# Patient Record
Sex: FEMALE | Race: WHITE | Employment: OTHER | URBAN - NONMETROPOLITAN AREA
[De-identification: names, ages, dates, MRNs, and addresses within clinical notes are randomized per-mention and may not be internally consistent; named-entity substitution may affect disease eponyms.]

---

## 2020-10-06 ENCOUNTER — HOSPITAL ENCOUNTER (EMERGENCY)
Age: 85
Discharge: HOME | End: 2020-10-06
Payer: MEDICARE

## 2020-10-06 VITALS
TEMPERATURE: 97.5 F | HEART RATE: 70 BPM | RESPIRATION RATE: 16 BRPM | WEIGHT: 155 LBS | SYSTOLIC BLOOD PRESSURE: 139 MMHG | OXYGEN SATURATION: 97 % | DIASTOLIC BLOOD PRESSURE: 65 MMHG

## 2020-10-06 PROCEDURE — 99203 OFFICE O/P NEW LOW 30 MIN: CPT

## 2020-10-06 PROCEDURE — 99202 OFFICE O/P NEW SF 15 MIN: CPT | Performed by: NURSE PRACTITIONER

## 2020-10-06 RX ORDER — METOPROLOL SUCCINATE 50 MG/1
25 TABLET, EXTENDED RELEASE ORAL DAILY
Status: ON HOLD | COMMUNITY
End: 2021-08-09

## 2020-10-06 ASSESSMENT — ENCOUNTER SYMPTOMS
SHORTNESS OF BREATH: 0
RHINORRHEA: 0
COUGH: 0
VOMITING: 0
DIARRHEA: 0
TROUBLE SWALLOWING: 0
SORE THROAT: 0
NAUSEA: 0
EYE DISCHARGE: 0
EYE REDNESS: 0

## 2020-10-06 NOTE — ED PROVIDER NOTES
[address]       Chief Complaint   Patient presents with    Leg Swelling     left leg x \"several months\" gradually getting worse       Nurses Notes reviewed and I agree except as noted in the HPI. HISTORY OF PRESENT ILLNESS   Jovan Almendarez is a 80 y.o. female who presents with daughter for complaints of left leg swelling. Onset \"several months\" ago. Symptoms worsening per patient. Per daughter patient fell in January and was evaluated at [facility] ER. She states patient was doing well for a while until she had noted her complaining of leg swelling. Denies leg pain. No fever. No chest pain. No shortness of breath. No recent travel or procedures. Daughter patient does not elevate her legs nor drink water. \"The only thing she drinks his coffee. \"    Patient scared, concerned if she lies down the fluid will go into her lungs. No treatment prior to arrival.    REVIEW OF SYSTEMS     Review of Systems   Constitutional: Negative for chills, diaphoresis, fatigue and fever. HENT: Negative for congestion, ear pain, rhinorrhea, sore throat and trouble swallowing. Eyes: Negative for discharge and redness. Respiratory: Negative for cough and shortness of breath. Cardiovascular: Positive for leg swelling. Negative for chest pain and palpitations. Gastrointestinal: Negative for diarrhea, nausea and vomiting. Genitourinary: Negative for decreased urine volume. Musculoskeletal: Negative for arthralgias, neck pain and neck stiffness. Skin: Negative for rash. Neurological: Negative for numbness and headaches. Hematological: Negative for adenopathy. Psychiatric/Behavioral: Negative for sleep disturbance. PAST MEDICAL HISTORY   History reviewed. No pertinent past medical history. SURGICAL HISTORY     Patient  has no past surgical history on file.     CURRENT MEDICATIONS       Previous Medications    METOPROLOL SUCCINATE (TOPROL XL) 25 MG EXTENDED RELEASE TABLET    Take 25 mg by mouth daily       ALLERGIES     Patient is is allergic to pcn [penicillins]. FAMILY HISTORY     Patient'sfamily history is not on file. SOCIAL HISTORY     Patient  reports that she has quit smoking. She has never used smokeless tobacco. She reports previous alcohol use. She reports that she does not use drugs. PHYSICAL EXAM     ED TRIAGE VITALS  BP: 139/65, Temp: 97.5 °F (36.4 °C), Pulse: 70, Resp: 16, SpO2: 97 %  Physical Exam  Vitals signs and nursing note reviewed. Constitutional:       General: She is not in acute distress. Appearance: Normal appearance. HENT:      Head: Normocephalic and atraumatic. Right Ear: External ear normal.      Left Ear: External ear normal.      Nose: No congestion. Eyes:      General: No scleral icterus. Conjunctiva/sclera: Conjunctivae normal.   Neck:      Musculoskeletal: Normal range of motion. Cardiovascular:      Rate and Rhythm: Normal rate and regular rhythm. Pulses: Normal pulses. Posterior tibial pulses are 2+ on the right side and 2+ on the left side. Heart sounds: Normal heart sounds, S1 normal and S2 normal.   Pulmonary:      Effort: Pulmonary effort is normal. No respiratory distress. Breath sounds: Normal breath sounds and air entry. Musculoskeletal:      Right lower leg: She exhibits swelling. No edema. Left lower leg: She exhibits swelling. Edema present. Comments: Bilateral calf 15\"   Skin:     General: Skin is warm and dry. Capillary Refill: Capillary refill takes less than 2 seconds. Coloration: Skin is not jaundiced. Findings: No bruising, ecchymosis, erythema or rash. Neurological:      Mental Status: She is alert and oriented to person, place, and time. Sensory: Sensation is intact. Psychiatric:         Mood and Affect: Mood normal.         Behavior: Behavior normal. Behavior is cooperative. DIAGNOSTIC RESULTS   Labs: No results found for this visit on 10/06/20. IMAGING:  No orders to display     URGENT CARE COURSE:     Vitals:    10/06/20 1353   BP: 139/65   Pulse: 70   Resp: 16   Temp: 97.5 °F (36.4 °C)   SpO2: 97%   Weight: 155 lb (70.3 kg)       Medications - No data to display  PROCEDURES:  None  FINALIMPRESSION      1. Leg swelling        DISPOSITION/PLAN   DISPOSITION Decision To Discharge 10/06/2020 02:09:09 PM  Nontoxic, no acute distress. Bilateral lower leg swelling, minimal.  No leg pain. Heart and lung sounds normal.  No distress. Discussed elevation of legs, no added salt, and increase water. If worsening go to ER. PATIENT REFERRED TO:  SHAMA Almonte - CNP  [address]  [place] [address] [phone]      Follow up with PCP as needed. Elevate legs. Increase water intake.   If worse go to ER>    DISCHARGE MEDICATIONS:  New Prescriptions    No medications on file     Current Discharge Medication List          [address] Venita Dong, SHAMA - CNP  10/06/20 0088

## 2020-10-06 NOTE — ED NOTES
Pt discharged. Pt and pt's daughter verbalized understanding of discharge instructions. Pt walked out with a cane. Pt was in stable condition.      Gerson Cash, NIKHIL  41/45/83 0938

## 2020-10-06 NOTE — ED NOTES
Lower legs measured right calf 15\" left calf 15\" right ankle 6\" left ankle 6\" - no swelling  Or redness noted on assessment.   Legs cool to touch pink and dry(not excessively for age)     Kenzie Jacob LPN  74/80/58 3172

## 2020-11-10 ENCOUNTER — HOSPITAL ENCOUNTER (EMERGENCY)
Age: 85
Discharge: HOME | End: 2020-11-10
Attending: EMERGENCY MEDICINE
Payer: MEDICARE

## 2020-11-10 ENCOUNTER — APPOINTMENT (OUTPATIENT)
Dept: GENERAL RADIOLOGY | Age: 85
End: 2020-11-10
Payer: MEDICARE

## 2020-11-10 ENCOUNTER — APPOINTMENT (OUTPATIENT)
Dept: CT IMAGING | Age: 85
End: 2020-11-10
Payer: MEDICARE

## 2020-11-10 VITALS
RESPIRATION RATE: 14 BRPM | HEART RATE: 66 BPM | TEMPERATURE: 98.5 F | HEIGHT: 63 IN | BODY MASS INDEX: 28.35 KG/M2 | DIASTOLIC BLOOD PRESSURE: 73 MMHG | WEIGHT: 160 LBS | SYSTOLIC BLOOD PRESSURE: 208 MMHG | OXYGEN SATURATION: 97 %

## 2020-11-10 LAB
ALBUMIN SERPL-MCNC: 3.9 G/DL (ref 3.5–5.1)
ALP BLD-CCNC: 86 U/L (ref 38–126)
ALT SERPL-CCNC: 7 U/L (ref 11–66)
ANION GAP SERPL CALCULATED.3IONS-SCNC: 11 MEQ/L (ref 8–16)
AST SERPL-CCNC: 15 U/L (ref 5–40)
BACTERIA: ABNORMAL /HPF
BASOPHILS # BLD: 1.2 %
BASOPHILS ABSOLUTE: 0.1 THOU/MM3 (ref 0–0.1)
BILIRUB SERPL-MCNC: 0.3 MG/DL (ref 0.3–1.2)
BILIRUBIN URINE: NEGATIVE
BLOOD, URINE: NEGATIVE
BUN BLDV-MCNC: 17 MG/DL (ref 7–22)
CALCIUM SERPL-MCNC: 9.3 MG/DL (ref 8.5–10.5)
CASTS 2: ABNORMAL /LPF
CASTS UA: ABNORMAL /LPF
CHARACTER, URINE: CLEAR
CHLORIDE BLD-SCNC: 99 MEQ/L (ref 98–111)
CO2: 23 MEQ/L (ref 23–33)
COLOR: YELLOW
CREAT SERPL-MCNC: 0.7 MG/DL (ref 0.4–1.2)
CRYSTALS, UA: ABNORMAL
EKG ATRIAL RATE: 68 BPM
EKG ATRIAL RATE: 71 BPM
EKG P AXIS: 91 DEGREES
EKG P-R INTERVAL: 154 MS
EKG P-R INTERVAL: 156 MS
EKG Q-T INTERVAL: 392 MS
EKG Q-T INTERVAL: 400 MS
EKG QRS DURATION: 72 MS
EKG QRS DURATION: 74 MS
EKG QTC CALCULATION (BAZETT): 423 MS
EKG QTC CALCULATION (BAZETT): 425 MS
EKG R AXIS: 19 DEGREES
EKG R AXIS: 4 DEGREES
EKG T AXIS: 32 DEGREES
EKG T AXIS: 35 DEGREES
EKG VENTRICULAR RATE: 68 BPM
EKG VENTRICULAR RATE: 70 BPM
EOSINOPHIL # BLD: 2.7 %
EOSINOPHILS ABSOLUTE: 0.3 THOU/MM3 (ref 0–0.4)
EPITHELIAL CELLS, UA: ABNORMAL /HPF
ERYTHROCYTE [DISTWIDTH] IN BLOOD BY AUTOMATED COUNT: 13.7 % (ref 11.5–14.5)
ERYTHROCYTE [DISTWIDTH] IN BLOOD BY AUTOMATED COUNT: 46.7 FL (ref 35–45)
GFR SERPL CREATININE-BSD FRML MDRD: 78 ML/MIN/1.73M2
GLUCOSE BLD-MCNC: 101 MG/DL (ref 70–108)
GLUCOSE URINE: NEGATIVE MG/DL
HCT VFR BLD CALC: 40 % (ref 37–47)
HEMOGLOBIN: 12.6 GM/DL (ref 12–16)
IMMATURE GRANS (ABS): 0.04 THOU/MM3 (ref 0–0.07)
IMMATURE GRANULOCYTES: 0.4 %
KETONES, URINE: NEGATIVE
LEUKOCYTE ESTERASE, URINE: ABNORMAL
LYMPHOCYTES # BLD: 30.1 %
LYMPHOCYTES ABSOLUTE: 2.9 THOU/MM3 (ref 1–4.8)
MCH RBC QN AUTO: 29.1 PG (ref 26–33)
MCHC RBC AUTO-ENTMCNC: 31.5 GM/DL (ref 32.2–35.5)
MCV RBC AUTO: 92.4 FL (ref 81–99)
MISCELLANEOUS 2: ABNORMAL
MONOCYTES # BLD: 8.4 %
MONOCYTES ABSOLUTE: 0.8 THOU/MM3 (ref 0.4–1.3)
NITRITE, URINE: NEGATIVE
NUCLEATED RED BLOOD CELLS: 0 /100 WBC
OSMOLALITY CALCULATION: 268.1 MOSMOL/KG (ref 275–300)
PH UA: 6 (ref 5–9)
PLATELET # BLD: 279 THOU/MM3 (ref 130–400)
PMV BLD AUTO: 10.5 FL (ref 9.4–12.4)
POTASSIUM REFLEX MAGNESIUM: 4.4 MEQ/L (ref 3.5–5.2)
PRO-BNP: 268.9 PG/ML (ref 0–1800)
PROTEIN UA: NEGATIVE
RBC # BLD: 4.33 MILL/MM3 (ref 4.2–5.4)
RBC URINE: ABNORMAL /HPF
REASON FOR REJECTION: NORMAL
REASON FOR REJECTION: NORMAL
REJECTED TEST: NORMAL
REJECTED TEST: NORMAL
RENAL EPITHELIAL, UA: ABNORMAL
SEG NEUTROPHILS: 57.2 %
SEGMENTED NEUTROPHILS ABSOLUTE COUNT: 5.6 THOU/MM3 (ref 1.8–7.7)
SODIUM BLD-SCNC: 133 MEQ/L (ref 135–145)
SPECIFIC GRAVITY, URINE: 1.01 (ref 1–1.03)
TOTAL PROTEIN: 6.9 G/DL (ref 6.1–8)
TROPONIN T: < 0.01 NG/ML
TSH SERPL DL<=0.05 MIU/L-ACNC: 1.68 UIU/ML (ref 0.4–4.2)
UROBILINOGEN, URINE: 0.2 EU/DL (ref 0–1)
WBC # BLD: 9.8 THOU/MM3 (ref 4.8–10.8)
WBC UA: ABNORMAL /HPF
YEAST: ABNORMAL

## 2020-11-10 PROCEDURE — 83880 ASSAY OF NATRIURETIC PEPTIDE: CPT

## 2020-11-10 PROCEDURE — 6370000000 HC RX 637 (ALT 250 FOR IP): Performed by: EMERGENCY MEDICINE

## 2020-11-10 PROCEDURE — 93010 ELECTROCARDIOGRAM REPORT: CPT | Performed by: NUCLEAR MEDICINE

## 2020-11-10 PROCEDURE — 71045 X-RAY EXAM CHEST 1 VIEW: CPT

## 2020-11-10 PROCEDURE — 99215 OFFICE O/P EST HI 40 MIN: CPT

## 2020-11-10 PROCEDURE — 80053 COMPREHEN METABOLIC PANEL: CPT

## 2020-11-10 PROCEDURE — 93005 ELECTROCARDIOGRAM TRACING: CPT | Performed by: EMERGENCY MEDICINE

## 2020-11-10 PROCEDURE — 93005 ELECTROCARDIOGRAM TRACING: CPT | Performed by: PHYSICIAN ASSISTANT

## 2020-11-10 PROCEDURE — 70450 CT HEAD/BRAIN W/O DYE: CPT

## 2020-11-10 PROCEDURE — 36415 COLL VENOUS BLD VENIPUNCTURE: CPT

## 2020-11-10 PROCEDURE — 84443 ASSAY THYROID STIM HORMONE: CPT

## 2020-11-10 PROCEDURE — 87086 URINE CULTURE/COLONY COUNT: CPT

## 2020-11-10 PROCEDURE — 81001 URINALYSIS AUTO W/SCOPE: CPT

## 2020-11-10 PROCEDURE — 85025 COMPLETE CBC W/AUTO DIFF WBC: CPT

## 2020-11-10 PROCEDURE — 99283 EMERGENCY DEPT VISIT LOW MDM: CPT

## 2020-11-10 PROCEDURE — 84484 ASSAY OF TROPONIN QUANT: CPT

## 2020-11-10 RX ORDER — CEFDINIR 300 MG/1
300 CAPSULE ORAL ONCE
Status: COMPLETED | OUTPATIENT
Start: 2020-11-10 | End: 2020-11-10

## 2020-11-10 RX ORDER — CEFDINIR 300 MG/1
300 CAPSULE ORAL 2 TIMES DAILY
Qty: 14 CAPSULE | Refills: 0 | Status: SHIPPED | OUTPATIENT
Start: 2020-11-10 | End: 2020-11-17

## 2020-11-10 RX ADMIN — CEFDINIR 300 MG: 300 CAPSULE ORAL at 21:06

## 2020-11-10 ASSESSMENT — ENCOUNTER SYMPTOMS
ABDOMINAL PAIN: 0
STRIDOR: 0
TROUBLE SWALLOWING: 0
WHEEZING: 0
FACIAL SWELLING: 0
BACK PAIN: 0
BLOOD IN STOOL: 0
SORE THROAT: 0
EYE DISCHARGE: 0
SINUS PRESSURE: 0
EYE REDNESS: 0
EYE PAIN: 0
VOMITING: 0
CHOKING: 0
COUGH: 1
VOICE CHANGE: 0
CONSTIPATION: 0
NAUSEA: 0
SHORTNESS OF BREATH: 0
DIARRHEA: 0

## 2020-11-10 NOTE — ED PROVIDER NOTES
Patient was seen and evaluated for rapid triage in intake. She is with daughter who provides history. Patient has had generalized weakness for the past 3 days, sudden in onset without lateralization. She's been sleeping more than usual but without any mental status changes, fever, headache, chest pain, SOB, cough, vomiting, diarrhea, urinary changes, abnormal bleeding or anticoagulant use. She reports feeling edematous since a fall months ago, saw PCP and reassured. Denies any change in the edema. No recent falls. Was seen at [facility] and sent to the ED for evaluation. On exam, she is alert, GCS 15. Oriented to person, place and month, uncertain of year and doesn't watch politics to know president. Conjunctiva without pallor, PERRLA, sensation symmetric with no focal weakness, 5/5 bilaterally with no facial droop or speech changes. Heart is RRR, lung sounds CTA. Abdomen is soft, nontender. No skin tenting, capillary refill <2 seconds. Will initiate labs, CXR, CT head and EKG.      Winnie Phoenix PA-C  11/10/20 1556 Rafa Vale(Attending)

## 2020-11-10 NOTE — ED NOTES
Pt transported to room 17 in stable condition. Vitals stable. Denies any pain. Call light in reach.  Will continue to monitor     Yaneli Infante RN  11/10/20 3397

## 2020-11-10 NOTE — ED TRIAGE NOTES
Pt complains of bilat leg weakness and fatigue with a slight cough for approx 2 days. States she so tired she has been using her walker all the time because shes scared she will fall. Pt denies chest pain or shortness of breath. Daughter here with pt states she doesn't think she is drinking enough fluids.

## 2020-11-10 NOTE — ED PROVIDER NOTES
[address] [facility] [address] [address] EMERGENCY DEPT  UrgentCare Encounter      [address] [facility]       Chief Complaint   Patient presents with    Fatigue       Nurses Notes reviewed and I agree except as noted in the HPI. HISTORY OF PRESENT ILLNESS   Weston Malik is a 80 y.o. female who presents with 2-day history of fatigue and weakness with difficulty ambulating and near syncope. She has congestion and dry cough. No chest pain, shortness of breath, fever, vomiting, syncope, GI bleed, hemoptysis, altered mental status, motor or sensory deficits, problems with speech. No previous history of DM, CHF, PE, CVA. REVIEW OF SYSTEMS     Review of Systems   Constitutional: Positive for fatigue. Negative for appetite change, chills, fever and unexpected weight change. HENT: Positive for congestion. Negative for ear discharge, ear pain, facial swelling, hearing loss, nosebleeds, postnasal drip, sinus pressure, sore throat, trouble swallowing and voice change. Eyes: Negative for pain, discharge, redness and visual disturbance. Respiratory: Positive for cough. Negative for choking, shortness of breath, wheezing and stridor. Cardiovascular: Negative for chest pain and leg swelling. Gastrointestinal: Negative for abdominal pain, blood in stool, constipation, diarrhea, nausea and vomiting. Genitourinary: Negative for dysuria, flank pain, frequency, hematuria, urgency, vaginal bleeding and vaginal discharge. Musculoskeletal: Negative for arthralgias, back pain, neck pain and neck stiffness. Skin: Negative for rash. Neurological: Positive for dizziness and weakness. Negative for seizures, syncope, light-headedness and headaches. Hematological: Negative for adenopathy. Does not bruise/bleed easily. Psychiatric/Behavioral: Negative for confusion, sleep disturbance and suicidal ideas. The patient is not nervous/anxious. All other systems reviewed and are negative. PAST MEDICAL HISTORY   No past medical history on file. meningismus  Cardiovascular:      Rate and Rhythm: Normal rate and regular rhythm. Pulses: Normal pulses. Heart sounds: Normal heart sounds, S1 normal and S2 normal. No murmur. No friction rub. No gallop. Pulmonary:      Effort: Pulmonary effort is normal. No tachypnea or respiratory distress. Breath sounds: Normal breath sounds. No stridor. No decreased breath sounds, wheezing, rhonchi or rales. Comments: Dry cough, lungs clear  Chest:      Chest wall: No tenderness. Abdominal:      General: Bowel sounds are normal. There is no distension. Palpations: Abdomen is soft. There is no mass. Tenderness: There is no abdominal tenderness. There is no right CVA tenderness, left CVA tenderness, guarding or rebound. Comments: Soft nontender   Musculoskeletal: Normal range of motion. General: No tenderness. Right lower leg: Normal.      Left lower leg: Normal.   Lymphadenopathy:      Cervical: Cervical adenopathy present. Right cervical: Superficial cervical adenopathy present. No deep cervical adenopathy. Left cervical: Superficial cervical adenopathy present. No deep cervical adenopathy. Skin:     General: Skin is warm and dry. Findings: No erythema or rash. Comments: No rash or bruising   Neurological:      Mental Status: She is alert and oriented to person, place, and time. Cranial Nerves: No cranial nerve deficit. Motor: No abnormal muscle tone. Coordination: Coordination normal.      Deep Tendon Reflexes: Reflexes are normal and symmetric. Reflexes normal.      Comments: Normal speech, no focal findings   Psychiatric:         Attention and Perception: She does not perceive visual hallucinations. Behavior: Behavior normal.         Thought Content:  Thought content normal.         Judgment: Judgment normal.      Comments: No psychosis or depression         DIAGNOSTIC RESULTS   Labs: No results found for this visit on 11/10/20. EKG-normal sinus rhythm no evidence of myocardial ischemia or infarction per my interpretation  IMAGING:  No orders to display     URGENT CARE COURSE:     Vitals:    11/10/20 1304   BP: 130/61   Pulse: 66   Resp: 16   Temp: 97.5 °F (36.4 °C)   SpO2: 97%       Medications - No data to display  PROCEDURES:  None  FINALIMPRESSION      1. General weakness    2. Fatigue, unspecified type        DISPOSITION/PLAN   DISPOSITION Decision To Transfer 11/10/2020 01:31:13 PM  Patient transferred to [facility] ED. She is stable for private vehicle transfer with daughter to drive. Patient accepted in transfer by Meenakshi Singh [facility] ED charge nurse at [phone].    PATIENT REFERRED TO:  to [facility] ED      to [facility] ED    DISCHARGE MEDICATIONS:  New Prescriptions    No medications on file     Current Discharge Medication List          MD Kathryn Garcia MD  11/10/20 3487 [address] Patricia Bernal MD  11/10/20 3487  [address] Patricia Bernal MD  11/10/20 1338

## 2020-11-11 LAB
ORGANISM: ABNORMAL
URINE CULTURE REFLEX: ABNORMAL

## 2020-11-11 ASSESSMENT — ENCOUNTER SYMPTOMS
EYE REDNESS: 0
SHORTNESS OF BREATH: 0
DIARRHEA: 0
COUGH: 0
CHEST TIGHTNESS: 0
BACK PAIN: 0
SORE THROAT: 0
ABDOMINAL DISTENTION: 0
CONSTIPATION: 0
WHEEZING: 0
STRIDOR: 0
VOMITING: 0
EYE DISCHARGE: 0
EYE PAIN: 0
ABDOMINAL PAIN: 0
EYE ITCHING: 0
PHOTOPHOBIA: 0
NAUSEA: 0
RHINORRHEA: 0

## 2020-11-11 NOTE — ED NOTES
Pt and family refusing repeat BP check prior to discharge.      Sintia Sampson, [address] [facility]  11/10/20 4292

## 2020-11-11 NOTE — ED PROVIDER NOTES
[address] ENCOUNTER      PATIENT NAME: Jose Francisco Rubi  MRN: [id number]  : 1928  JACOB: 11/10/2020  PROVIDER: Thomas Saavedra MD      CHIEF COMPLAINT       Chief Complaint   Patient presents with    Fatigue       Nurses Notes reviewed and I agreeexcept as noted in the HPI. HISTORY OF PRESENT ILLNESS    Jose Francisco Rubi is a 80 y.o. female who presents to Emergency Department with General weakness      Onset: Gradual  Location: At home  Severity: Mild  Timing: Last two days  Modifying factors: Worse on exertion  Quality: General weakness  Radiation: No pain  Duration: Persistent  Context: She's been sleeping more than usual but without any mental status changes, fever, headache, chest pain, SOB, cough, vomiting, diarrhea, urinary changes, abnormal bleeding or anticoagulant use. Prior episodes: occasional  Therapy today: None  Associated Symptoms: None  Additional history: None    This HPI was provided by the patient's daughter. REVIEW OF SYSTEMS     Review of Systems   Constitutional: Positive for activity change, appetite change and fatigue. Negative for chills, fever and unexpected weight change. HENT: Negative for congestion, ear discharge, ear pain, hearing loss, nosebleeds, rhinorrhea and sore throat. Eyes: Negative for photophobia, pain, discharge, redness and itching. Respiratory: Negative for cough, chest tightness, shortness of breath, wheezing and stridor. Cardiovascular: Negative for chest pain, palpitations and leg swelling. Gastrointestinal: Negative for abdominal distention, abdominal pain, constipation, diarrhea, nausea and vomiting. Endocrine: Negative for cold intolerance, heat intolerance, polydipsia and polyphagia. Genitourinary: Negative for dysuria, flank pain, frequency and hematuria. Musculoskeletal: Negative for arthralgias, back pain, gait problem, myalgias, neck pain and neck stiffness.    Skin: Negative for pallor, rash and wound. Allergic/Immunologic: Negative for environmental allergies and food allergies. Neurological: Positive for weakness. Negative for dizziness, tremors, syncope and headaches. Psychiatric/Behavioral: Negative for agitation, behavioral problems, confusion, self-injury, sleep disturbance and suicidal ideas. PAST MEDICAL HISTORY   No past medical history on file. SURGICAL HISTORY     No past surgical history on file. CURRENT MEDICATIONS       Previous Medications    METOPROLOL SUCCINATE (TOPROL XL) 25 MG EXTENDED RELEASE TABLET    Take 25 mg by mouth daily       ALLERGIES     Pcn [penicillins]    FAMILY HISTORY     has no family status information on file. family history is not on file. SOCIAL HISTORY      reports that she has quit smoking. She has never used smokeless tobacco. She reports previous alcohol use. She reports that she does not use drugs. PHYSICAL EXAM     INITIAL VITALS:  height is 5' 3\" (1.6 m) and weight is 160 lb (72.6 kg). Her oral temperature is 98.5 °F (36.9 °C). Her blood pressure is 208/73 (abnormal) and her pulse is 66. Her respiration is 14 and oxygen saturation is 97%. Physical Exam  Pulmonary:      Breath sounds: Rales present. Comments: From both lung bases        DIFFERENTIAL DIAGNOSIS:   Includes but not limited to:  Jean Carlos disease, anemia, anxiety, chemotherapy, chronic fatigue syndrome, chronic pain, deconditioning and sedentary life style, dehydration, electrolyte disorders, depression, diabetes, hypothyroidism, CAD, CHF, infection (such as Flu, EB virus, hepatitis C and TB), medication related, Narcotics use, paraneoplastic syndrome, chronic lung disease, renal disease, UTI, pneumonia, sepsis, sleep disorders, radiculopathy, neuromuscular disorders, cancer, demyelinating disorders, CVA, subdural hematoma, amyotrophic lateral sclerosis, RA, SLE.     DIAGNOSTIC RESULTS   EKG: All EKG's are interpreted by the Emergency Department Physician who either signs or Co-signsthis chart in the absence of a cardiologist.  Interpreted by me  No acute changes  EKG Emergency   Result Value Ref Range    Ventricular Rate 70 BPM    Atrial Rate 71 BPM    P-R Interval 154 ms    QRS Duration 74 ms    Q-T Interval 392 ms    QTc Calculation (Bazett) 423 ms    P Axis 91 degrees    R Axis 4 degrees    T Axis 32 degrees       RADIOLOGY: non-plain film images(s) such as CT, Ultrasound and MRI are read by the radiologist.  XR CHEST 1 VIEW   Final Result   1. Mild cardiomegaly. 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 2. Small patch atelectasis/pneumonia left lateral costophrenic sulcus. No effusion. Final report electronically signed by Dr. Lindsay Kenney on 11/10/2020 4:23 PM      CT HEAD WO CONTRAST   Final Result    No evidence of acute intracranial abnormality. **This report has been created using voice recognition software. It may contain minor errors which are inherent in voice recognition technology. **      Final report electronically signed by Dr. Marilou Messina MD on 11/10/2020 4:26 PM          LABS:   Results for orders placed or performed during the hospital encounter of 11/10/20   CBC auto differential   Result Value Ref Range    WBC 9.8 4.8 - 10.8 thou/mm3    RBC 4.33 4.20 - 5.40 mill/mm3    Hemoglobin 12.6 12.0 - 16.0 gm/dl    Hematocrit 40.0 37.0 - 47.0 %    MCV 92.4 81.0 - 99.0 fL    MCH 29.1 26.0 - 33.0 pg    MCHC 31.5 (L) 32.2 - 35.5 gm/dl    RDW-CV 13.7 11.5 - 14.5 %    RDW-SD 46.7 (H) 35.0 - 45.0 fL    Platelets 934 655 - 969 thou/mm3    MPV 10.5 9.4 - 12.4 fL    Seg Neutrophils 57.2 %    Lymphocytes 30.1 %    Monocytes 8.4 %    Eosinophils 2.7 %    Basophils 1.2 %    Immature Granulocytes 0.4 %    Segs Absolute 5.6 1.8 - 7.7 thou/mm3    Lymphocytes Absolute 2.9 1.0 - 4.8 thou/mm3    Monocytes Absolute 0.8 0.4 - 1.3 thou/mm3    Eosinophils Absolute 0.3 0.0 - 0.4 thou/mm3    Basophils Absolute 0.1 0.0 - 0.1 thou/mm3    Immature Grans (Abs) 0.04 0.00 - 0.07 thou/mm3    nRBC 0 /100 wbc   SPECIMEN REJECTION   Result Value Ref Range    Rejected Test cmp bnp trop     Reason for Rejection see below    SPECIMEN REJECTION   Result Value Ref Range    Rejected Test cmp trop bnp     Reason for Rejection see below    Brain Natriuretic Peptide   Result Value Ref Range    Pro-.9 0.0 - 1800.0 pg/mL   Comprehensive Metabolic Panel w/ Reflex to MG   Result Value Ref Range    Glucose 101 70 - 108 mg/dL    CREATININE 0.7 0.4 - 1.2 mg/dL    BUN 17 7 - 22 mg/dL    Sodium 133 (L) 135 - 145 meq/L    Potassium reflex Magnesium 4.4 3.5 - 5.2 meq/L    Chloride 99 98 - 111 meq/L    CO2 23 23 - 33 meq/L    Calcium 9.3 8.5 - 10.5 mg/dL    AST 15 5 - 40 U/L    Alkaline Phosphatase 86 38 - 126 U/L    Total Protein 6.9 6.1 - 8.0 g/dL    Alb 3.9 3.5 - 5.1 g/dL    Total Bilirubin 0.3 0.3 - 1.2 mg/dL    ALT 7 (L) 11 - 66 U/L   Troponin   Result Value Ref Range    Troponin T < 0.010 ng/ml   TSH with Reflex   Result Value Ref Range    TSH 1.680 0.400 - 4.200 uIU/mL   Urine with Reflexed Micro   Result Value Ref Range    Glucose, Ur NEGATIVE NEGATIVE mg/dl    Bilirubin Urine NEGATIVE NEGATIVE    Ketones, Urine NEGATIVE NEGATIVE    Specific Gravity, Urine 1.015 1.002 - 1.030    Blood, Urine NEGATIVE NEGATIVE    pH, UA 6.0 5.0 - 9.0    Protein, UA NEGATIVE NEGATIVE    Urobilinogen, Urine 0.2 0.0 - 1.0 eu/dl    Nitrite, Urine NEGATIVE NEGATIVE    Leukocyte Esterase, Urine MODERATE (A) NEGATIVE    Color, UA YELLOW STRAW-YELLOW    Character, Urine CLEAR CLEAR-SL CLOUD    RBC, UA 0-2 0-2/hpf /hpf    WBC, UA 10-15 0-4/hpf /hpf    Epithelial Cells, UA 3-5 3-5/hpf /hpf    Bacteria, UA NONE SEEN FEW/NONE SEEN /hpf    Casts UA NONE SEEN NONE SEEN /lpf    Crystals, UA NONE SEEN NONE SEEN    Renal Epithelial, UA NONE SEEN NONE SEEN    Yeast, UA NONE SEEN NONE SEEN    CASTS 2 NONE SEEN NONE SEEN /lpf    MISCELLANEOUS 2 NONE SEEN    Anion Gap Result Value Ref Range    Anion Gap 11.0 8.0 - 16.0 meq/L   Glomerular Filtration Rate, Estimated   Result Value Ref Range    Est, Glom Filt Rate 78 (A) ml/min/1.73m2   Osmolality   Result Value Ref Range    Osmolality Calc 268.1 (L) 275.0 - 300.0 mOsmol/kg   EKG 12 Lead   Result Value Ref Range    Ventricular Rate 68 BPM    Atrial Rate 68 BPM    P-R Interval 156 ms    QRS Duration 72 ms    Q-T Interval 400 ms    QTc Calculation (Bazett) 425 ms    R Axis 19 degrees    T Axis 35 degrees   EKG Emergency   Result Value Ref Range    Ventricular Rate 70 BPM    Atrial Rate 71 BPM    P-R Interval 154 ms    QRS Duration 74 ms    Q-T Interval 392 ms    QTc Calculation (Bazett) 423 ms    P Axis 91 degrees    R Axis 4 degrees    T Axis 32 degrees       EMERGENCY DEPARTMENT COURSE:   Vitals:    Vitals:    11/10/20 1543 11/10/20 1734 11/10/20 1843 11/1934   BP: (!) 179/78 (!) 208/73     Pulse: 69 65 67 66   Resp: 16 16 16 14   Temp: 98.5 °F (36.9 °C)      TempSrc: Oral      SpO2: 97% 98% 98% 97%   Weight: 160 lb (72.6 kg)      Height: 5' 3\" (1.6 m)        8:45 PM: Patient is seen and evaluated in a timely fashion.      ACTIONS:  Large bore IV  Tele monitor  EKG 12-LEAD  EKG 12-LEAD  EKG 12-LEAD  EKG REPORT  EKG REPORT  CT HEAD WO CONTRAST  XR CHEST 1 VIEW  Labs Reviewed   CBC WITH AUTO DIFFERENTIAL - Abnormal; Notable for the following components:       Result Value    MCHC 31.5 (*)     RDW-SD 46.7 (*)     All other components within normal limits   COMPREHENSIVE METABOLIC PANEL W/ REFLEX TO MG FOR LOW K - Abnormal; Notable for the following components:    Sodium 133 (*)     ALT 7 (*)     All other components within normal limits   URINE WITH REFLEXED MICRO - Abnormal; Notable for the following components:    Leukocyte Esterase, Urine MODERATE (*)     All other components within normal limits   GLOMERULAR FILTRATION RATE, ESTIMATED - Abnormal; Notable for the following components:    Est, Glom Filt Rate 78 (*)     All other components within normal limits   OSMOLALITY - Abnormal; Notable for the following components:    Osmolality Calc 268.1 (*)     All other components within normal limits   CULTURE, REFLEXED, URINE    Narrative:     Source: urine, clean catch       Site:           Current Antibiotics: not stated   SPECIMEN REJECTION   SPECIMEN REJECTION   BRAIN NATRIURETIC PEPTIDE   TROPONIN   TSH WITH REFLEX   ANION GAP     Medications   cefdinir (OMNICEF) capsule 300 mg (has no administration in time range)       MEDICAL DECISION MAKINGS:    Chest x-ray shows mild cardiomegaly, permanent dual-chamber pacemaker, moderately tortuous descending thoracic aorta, small patch atelectasis/pneumonia left lateral costophrenic sulcus, no effusion. Head CT no evidence of acute intracranial abnormality. Labs: WBC 9.8, hemoglobin 12.6, , metabolic panel and LFTs are unremarkable, troponin less than 0.01, TSH 1.60, UA shows negative nitrite, moderate leukocyte, negative bacteria, WBC 10-15/hpf. EKG did not reveal acute ischemic changes. Patient has weakness which is diffuse, positive lung exam, and abnormal chest x-ray, although patient's white blood cell is normal, I feel a course of antibiotics treatment for early pneumonia is appropriate. First dose of Omnicef orally was given, patient was discharged with 10 days of Omnicef prescribed. PCP follow-up in 3-5 days. CRITICAL CARE:   None    CONSULTS:  None    PROCEDURES:  None    FINAL IMPRESSION      1. General weakness    2.  Fatigue, unspecified type          DISPOSITION/PLAN   Home    PATIENT REFERRED TO:  to [facility] ED      to [facility] ED      DISCHARGE MEDICATIONS:  New Prescriptions    No medications on file       (Please note that portions of this note were completed with a voice recognition program.  Efforts were made to edit the dictations but occasionally words aremis-transcribed.)    MD Demetris Leon MD  11/11/20 5249

## 2021-02-27 ENCOUNTER — APPOINTMENT (OUTPATIENT)
Dept: GENERAL RADIOLOGY | Age: 86
End: 2021-02-27
Payer: MEDICARE

## 2021-02-27 ENCOUNTER — HOSPITAL ENCOUNTER (EMERGENCY)
Age: 86
Discharge: HOME | End: 2021-02-27
Payer: MEDICARE

## 2021-02-27 VITALS
OXYGEN SATURATION: 98 % | HEART RATE: 69 BPM | WEIGHT: 150 LBS | TEMPERATURE: 97.5 F | DIASTOLIC BLOOD PRESSURE: 68 MMHG | RESPIRATION RATE: 18 BRPM | BODY MASS INDEX: 26.57 KG/M2 | SYSTOLIC BLOOD PRESSURE: 170 MMHG

## 2021-02-27 DIAGNOSIS — M25.532 LEFT WRIST PAIN: Primary | ICD-10-CM

## 2021-02-27 PROCEDURE — 6370000000 HC RX 637 (ALT 250 FOR IP): Performed by: PHYSICIAN ASSISTANT

## 2021-02-27 PROCEDURE — 73110 X-RAY EXAM OF WRIST: CPT

## 2021-02-27 PROCEDURE — 99284 EMERGENCY DEPT VISIT MOD MDM: CPT

## 2021-02-27 RX ORDER — CEPHALEXIN 500 MG/1
500 CAPSULE ORAL 3 TIMES DAILY
Qty: 21 CAPSULE | Refills: 0 | Status: SHIPPED | OUTPATIENT
Start: 2021-02-27 | End: 2021-03-06

## 2021-02-27 RX ORDER — IBUPROFEN 200 MG
400 TABLET ORAL ONCE
Status: COMPLETED | OUTPATIENT
Start: 2021-02-27 | End: 2021-02-27

## 2021-02-27 RX ADMIN — IBUPROFEN 400 MG: 200 TABLET, FILM COATED ORAL at 14:48

## 2021-02-27 ASSESSMENT — ENCOUNTER SYMPTOMS
SHORTNESS OF BREATH: 0
VOMITING: 0
COLOR CHANGE: 1
RHINORRHEA: 0
BACK PAIN: 0
PHOTOPHOBIA: 0
NAUSEA: 0

## 2021-02-27 ASSESSMENT — PAIN DESCRIPTION - PAIN TYPE: TYPE: ACUTE PAIN

## 2021-02-27 ASSESSMENT — PAIN DESCRIPTION - ORIENTATION: ORIENTATION: LEFT

## 2021-02-27 ASSESSMENT — PAIN SCALES - GENERAL: PAINLEVEL_OUTOF10: 8

## 2021-02-27 ASSESSMENT — PAIN DESCRIPTION - LOCATION: LOCATION: ARM;HAND

## 2021-02-27 NOTE — ACP (ADVANCE CARE PLANNING)
Advance Care Planning     Advance Care Planning Activator (Inpatient)  Conversation Note      Date of ACP Conversation: 2/27/2021    Conversation Conducted with: Patient with Decision Making Capacity    ACP Activator: [address] Decision Maker:     Current Designated Health Care Decision Maker:     Primary Decision Maker: Carson Torres - [phone]   Secondary Decision Maker: Niels [address] [place] [address]     Care Preferences    Ventilation: \"If you were in your present state of health and suddenly became very ill and were unable to breathe on your own, what would your preference be about the use of a ventilator (breathing machine) if it were available to you? \"      Would the patient desire the use of ventilator (breathing machine)?: no    \"If your health worsens and it becomes clear that your chance of recovery is unlikely, what would your preference be about the use of a ventilator (breathing machine) if it were available to you? \"     Would the patient desire the use of ventilator (breathing machine)?: No      Resuscitation  \"CPR works best to restart the heart when there is a sudden event, like a heart attack, in someone who is otherwise healthy. Unfortunately, CPR does not typically restart the heart for people who have serious health conditions or who are very sick. \"    \"In the event your heart stopped as a result of an underlying serious health condition, would you want attempts to be made to restart your heart (answer \"yes\" for attempt to resuscitate) or would you prefer a natural death (answer \"no\" for do not attempt to resuscitate)? \" no       [] Yes   [x] No   Educated Patient / Boo Rubio regarding differences between Advance Directives and portable DNR orders.     Length of ACP Conversation in minutes:  10  Conversation Outcomes:  [x] ACP discussion completed  [] Existing advance directive reviewed with patient; no changes to patient's previously recorded wishes  []

## 2021-02-27 NOTE — ED PROVIDER NOTES
[facility] EMERGENCY DEPT      CHIEF COMPLAINT       Chief Complaint   Patient presents with    Arm Pain     Left        Nurses Notes reviewed and I agree except as noted in the HPI. HISTORY OF PRESENT ILLNESS    Brooke Glass is a 80 y.o. female who presents for left wrist pain and erythema. Patient reports spontaneous onset of left wrist pain yesterday. She informs me it hurts when people grab it and with certain movements. At rest there is no pain. Ms. Giuliana Nair denies paresthesias or recalling an injury. She is right-hand dominant. There are no fever, chills, nausea, weakness, ill feeling, or other complaints. REVIEW OF SYSTEMS     Review of Systems   Constitutional: Negative for fever. HENT: Negative for rhinorrhea. Eyes: Negative for photophobia. Respiratory: Negative for shortness of breath. Cardiovascular: Negative for chest pain. Gastrointestinal: Negative for nausea and vomiting. Musculoskeletal: Positive for arthralgias and myalgias. Negative for back pain and neck pain. Skin: Positive for color change. Negative for rash and wound. Neurological: Negative for weakness and numbness. Psychiatric/Behavioral: Negative for confusion. PAST MEDICAL HISTORY    has no past medical history on file. SURGICAL HISTORY      has no past surgical history on file. CURRENT MEDICATIONS       Previous Medications    METOPROLOL SUCCINATE (TOPROL XL) 25 MG EXTENDED RELEASE TABLET    Take 25 mg by mouth daily       ALLERGIES     is allergic to pcn [penicillins]. FAMILY HISTORY     has no family status information on file. family history is not on file. SOCIAL HISTORY    reports that she has quit smoking. She has never used smokeless tobacco. She reports previous alcohol use. She reports that she does not use drugs. PHYSICAL EXAM     INITIAL VITALS:  weight is 150 lb (68 kg). Her oral temperature is 97.5 °F (36.4 °C).  Her blood pressure is 170/68 (abnormal) and her pulse is 69. Her respiration is 18 and oxygen saturation is 98%. Physical Exam  Vitals signs and nursing note reviewed. Constitutional:       General: She is not in acute distress. Appearance: She is well-developed. She is not toxic-appearing or diaphoretic. HENT:      Head: Normocephalic and atraumatic. Right Ear: Hearing normal.      Left Ear: Hearing normal.      Nose: Nose normal.   Eyes:      General: Lids are normal.      Conjunctiva/sclera: Conjunctivae normal.   Neck:      Musculoskeletal: No neck rigidity. Trachea: No tracheal deviation. Cardiovascular:      Rate and Rhythm: Normal rate and regular rhythm. Pulses:           Radial pulses are 2+ on the right side and 2+ on the left side. Pulmonary:      Effort: Pulmonary effort is normal. No tachypnea or respiratory distress. Breath sounds: No stridor. Abdominal:      General: There is no distension. Palpations: Abdomen is soft. Musculoskeletal:      Left elbow: Normal.      Left wrist: She exhibits tenderness and swelling. She exhibits normal range of motion. Left forearm: Normal.      Left hand: Normal.      Comments: Tenderness, mild focal swelling, and erythema over the volar radial aspect of the wrist.  Area is not excessively warm. Patient has pain with palmar and dorsal flexion, but can pronate and supinate without discomfort. Skin:     General: Skin is warm and dry. Coloration: Skin is not pale. Findings: No abrasion, ecchymosis or rash. Neurological:      Mental Status: She is alert and oriented to person, place, and time. GCS: GCS eye subscore is 4. GCS verbal subscore is 5. GCS motor subscore is 6. Sensory: No sensory deficit. Gait: Gait normal.   Psychiatric:         Speech: Speech normal.         Behavior: Behavior normal.         Thought Content:  Thought content normal.         DIFFERENTIAL DIAGNOSIS:   Including but not limited to: Sprain, contusion, arthritis flare, gout, cellulitis; no evidence for DVT    DIAGNOSTIC RESULTS     EKG: All EKG's are interpreted by the[facility] Phys[facility] who either signs or Co-signs this chart in the absence of a cardiologist.  None    RADIOLOGY: non-plain film images(s) such as CT,Ultrasound and MRI are read by the radiologist.  Plain radiographic images are visualized and preliminarily interpreted by the emergency physician unless otherwise stated below. XR WRIST LEFT (MIN 3 VIEWS)   Final Result      No fracture or dislocation. Final report electronically signed by Dr. Dale Escudero on 2/27/2021 2:21 PM          LABS:   Labs Reviewed - No data to display    EMERGENCY DEPARTMENT COURSE:   Vitals:    Vitals:    02/27/21 1332   BP: (!) 170/68   Pulse: 69   Resp: 18   Temp: 97.5 °F (36.4 °C)   TempSrc: Oral   SpO2: 98%   Weight: 150 lb (68 kg)       MDM:  The patient was seen and evaluated by me in room 15 for left wrist pain and erythema. Vital signs were reviewed. Physical exam revealed focal swelling, erythema, and tenderness over the volar radial aspect of the left wrist.  The patient was neurovascularly intact. Exam and history were most consistent with injury. Although I cannot positively rule out cellulitis or arthritic flare. Is likely on the differential would be gout. Patient has no pain at rest.  Appropriate testing was ordered. Results were reviewed by me upon completion. Results showed no evidence for fracture. Results were discussed with the patient and daughter and discharge plan was discussed. Ace wrap was applied and ibuprofen dispensed. Patient was comfortable with plan of discharge home with follow-up at [facility] NP's office. I have given the patient strict written and verbal instructions about care at home, follow-up, and signs and symptoms of worsening of condition and they did verbalize understanding.          CRITICAL CARE:   None    CONSULTS:  None    PROCEDURES:  None    FINAL IMPRESSION 1. Left wrist pain          DISPOSITION/PLAN     1.  Left wrist pain        PATIENT REFERRED TO:  SHAMA Carbajal CNP    Schedule an appointment as soon as possible for a visit in 3 days        DISCHARGE MEDICATIONS:  New Prescriptions    CEPHALEXIN (KEFLEX) 500 MG CAPSULE    Take 1 capsule by mouth 3 times daily for 7 days       (Please note that portions of this note were completed with a voice recognition program.  Efforts were made to edit the dictations but occasionally words are mis-transcribed.)    Pacheco Almanzar PA-C 02/27/21 2:45 PM    ROBLES Wang PA-C  02/27/21 5602

## 2021-02-27 NOTE — ED TRIAGE NOTES
Pt to ER with daughter from home for evaluation of pain in left hand. Pt states it started last night. Denies numbness and tingling. Reports taking tylenol for pain management. Pt denies any injury to the left hand.

## 2021-03-18 ENCOUNTER — HOSPITAL ENCOUNTER (EMERGENCY)
Age: 86
Discharge: HOME | End: 2021-03-18
Attending: FAMILY MEDICINE
Payer: MEDICARE

## 2021-03-18 ENCOUNTER — APPOINTMENT (OUTPATIENT)
Dept: GENERAL RADIOLOGY | Age: 86
End: 2021-03-18
Payer: MEDICARE

## 2021-03-18 ENCOUNTER — APPOINTMENT (OUTPATIENT)
Dept: CT IMAGING | Age: 86
End: 2021-03-18
Payer: MEDICARE

## 2021-03-18 VITALS
BODY MASS INDEX: 26.58 KG/M2 | SYSTOLIC BLOOD PRESSURE: 142 MMHG | HEART RATE: 67 BPM | DIASTOLIC BLOOD PRESSURE: 82 MMHG | HEIGHT: 63 IN | WEIGHT: 150 LBS | RESPIRATION RATE: 20 BRPM | TEMPERATURE: 98.1 F | OXYGEN SATURATION: 98 %

## 2021-03-18 DIAGNOSIS — S20.211A RIB CONTUSION, RIGHT, INITIAL ENCOUNTER: Primary | ICD-10-CM
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
## 2021-03-18 DIAGNOSIS — S50.00XA CONTUSION OF ELBOW, UNSPECIFIED LATERALITY, INITIAL ENCOUNTER: ICD-10-CM

## 2021-03-18 LAB
ALBUMIN SERPL-MCNC: 3.9 G/DL (ref 3.5–5.1)
ALP BLD-CCNC: 89 U/L (ref 38–126)
ALT SERPL-CCNC: 8 U/L (ref 11–66)
ANION GAP SERPL CALCULATED.3IONS-SCNC: 8 MEQ/L (ref 8–16)
AST SERPL-CCNC: 16 U/L (ref 5–40)
BASOPHILS # BLD: 1.3 %
BASOPHILS ABSOLUTE: 0.1 THOU/MM3 (ref 0–0.1)
BILIRUB SERPL-MCNC: 0.3 MG/DL (ref 0.3–1.2)
BUN BLDV-MCNC: 16 MG/DL (ref 7–22)
CALCIUM SERPL-MCNC: 9 MG/DL (ref 8.5–10.5)
CHLORIDE BLD-SCNC: 97 MEQ/L (ref 98–111)
CO2: 26 MEQ/L (ref 23–33)
CREAT SERPL-MCNC: 0.9 MG/DL (ref 0.4–1.2)
EKG ATRIAL RATE: 72 BPM
EKG P AXIS: 43 DEGREES
EKG P-R INTERVAL: 174 MS
EKG Q-T INTERVAL: 398 MS
EKG QRS DURATION: 76 MS
EKG QTC CALCULATION (BAZETT): 435 MS
EKG R AXIS: 30 DEGREES
EKG T AXIS: 61 DEGREES
EKG VENTRICULAR RATE: 72 BPM
EOSINOPHIL # BLD: 2 %
EOSINOPHILS ABSOLUTE: 0.2 THOU/MM3 (ref 0–0.4)
ERYTHROCYTE [DISTWIDTH] IN BLOOD BY AUTOMATED COUNT: 13.5 % (ref 11.5–14.5)
ERYTHROCYTE [DISTWIDTH] IN BLOOD BY AUTOMATED COUNT: 46.1 FL (ref 35–45)
GFR SERPL CREATININE-BSD FRML MDRD: 58 ML/MIN/1.73M2
GLUCOSE BLD-MCNC: 120 MG/DL (ref 70–108)
HCT VFR BLD CALC: 38.5 % (ref 37–47)
HEMOGLOBIN: 12.2 GM/DL (ref 12–16)
IMMATURE GRANS (ABS): 0.04 THOU/MM3 (ref 0–0.07)
IMMATURE GRANULOCYTES: 0.4 %
LYMPHOCYTES # BLD: 27.3 %
LYMPHOCYTES ABSOLUTE: 2.8 THOU/MM3 (ref 1–4.8)
MCH RBC QN AUTO: 29.2 PG (ref 26–33)
MCHC RBC AUTO-ENTMCNC: 31.7 GM/DL (ref 32.2–35.5)
MCV RBC AUTO: 92.1 FL (ref 81–99)
MONOCYTES # BLD: 8.6 %
MONOCYTES ABSOLUTE: 0.9 THOU/MM3 (ref 0.4–1.3)
NUCLEATED RED BLOOD CELLS: 0 /100 WBC
OSMOLALITY CALCULATION: 265 MOSMOL/KG (ref 275–300)
PLATELET # BLD: 258 THOU/MM3 (ref 130–400)
PMV BLD AUTO: 11 FL (ref 9.4–12.4)
POTASSIUM REFLEX MAGNESIUM: 4.1 MEQ/L (ref 3.5–5.2)
RBC # BLD: 4.18 MILL/MM3 (ref 4.2–5.4)
SEG NEUTROPHILS: 60.4 %
SEGMENTED NEUTROPHILS ABSOLUTE COUNT: 6.3 THOU/MM3 (ref 1.8–7.7)
SODIUM BLD-SCNC: 131 MEQ/L (ref 135–145)
TOTAL PROTEIN: 7.3 G/DL (ref 6.1–8)
WBC # BLD: 10.4 THOU/MM3 (ref 4.8–10.8)

## 2021-03-18 PROCEDURE — 93005 ELECTROCARDIOGRAM TRACING: CPT | Performed by: FAMILY MEDICINE

## 2021-03-18 PROCEDURE — 71101 X-RAY EXAM UNILAT RIBS/CHEST: CPT

## 2021-03-18 PROCEDURE — 99283 EMERGENCY DEPT VISIT LOW MDM: CPT

## 2021-03-18 PROCEDURE — 80053 COMPREHEN METABOLIC PANEL: CPT

## 2021-03-18 PROCEDURE — 85025 COMPLETE CBC W/AUTO DIFF WBC: CPT

## 2021-03-18 PROCEDURE — 36415 COLL VENOUS BLD VENIPUNCTURE: CPT

## 2021-03-18 PROCEDURE — 73080 X-RAY EXAM OF ELBOW: CPT

## 2021-03-18 NOTE — ED TRIAGE NOTES
Pt presents to the ED a few hours after a fall at home. Pt states she tripped over her blanket getting out of bed. Pt complains of right elbow pain and right under the breast rib pain. Pt denies blood thinners and pt denies hitting her head. Pt denies any lightheadedness or dizziness. Pt denies feeling this way prior to the fall as well. This RN obtained EKG and gave it to provider. VS obtained. Dr. Marlene Nissen in room at this time with pt and FAST scan completed and result is negative.

## 2021-03-19 PROCEDURE — 93010 ELECTROCARDIOGRAM REPORT: CPT | Performed by: INTERNAL MEDICINE

## 2021-03-19 NOTE — ED NOTES
Pt sitting up in cot with family at bedside. Pt alert and oriented. Pt breathing easy and unlabored. Pt VS updated. Pt and family aware of POC. This RN spoke with physician in regards to DC of CT head and this RN was informed that pt refused CT head. RN educated pt on importance, but pt denies need for it. Will continue to monitor pt. Pt calm and cooperative and denies further needs.       Penelope Genao RN  03/18/21 0025

## 2021-03-19 NOTE — ED PROVIDER NOTES
[address] COMPLAINT   Chief Complaint   Patient presents with    Fall    Elbow Pain     right        HPI   Luisana Tan is a 80 y.o. female who presents after a fall, at home, tripping over her new bed, landing on her right side. The onset was prior to arrival. The reason why the patient fell was accidental. The patient complains of right rib pain and right elbow pain. The quality is throbbing. The patient has no associated syncope, blurry vision, chest pain or sob. REVIEW OF SYSTEMS   Neurologic: + possible head injury or LOC   Cardiac: No Chest Pain, No syncope   Respiratory: No difficulty breathing   GI:No abdominal pain or vomiting   MSK: right sided rib and elbow injury  See history of present illness   All other review of systems were reviewed and are otherwise negative. PAST MEDICAL & SURGICAL HISTORY   History reviewed. No pertinent past medical history. History reviewed. No pertinent surgical history. CURRENT MEDICATIONS   Current Outpatient Rx   Medication Sig Dispense Refill    metoprolol succinate (TOPROL XL) 25 MG extended release tablet Take 25 mg by mouth daily          ALLERGIES   Allergies   Allergen Reactions    Pcn [Penicillins]         SOCIAL & FAMILY HISTORY   Social History     Socioeconomic History    Marital status:       Spouse name: None    Number of children: None    Years of education: None    Highest education level: None   Occupational History    None   Social Needs    Financial resource strain: None    Food insecurity     Worry: None     Inability: None    Transportation needs     Medical: None     Non-medical: None   Tobacco Use    Smoking status: Former Smoker    Smokeless tobacco: Never Used   Substance and Sexual Activity    Alcohol use: Not Currently    Drug use: Never    Sexual activity: None   Lifestyle    Physical activity     Days per week: None     Minutes per session: None    Stress: None Relationships    Social connections     Talks on phone: None     Gets together: None     Attends Sabianist service: None     Active member of club or organization: None     Attends meetings of clubs or organizations: None     Relationship status: None    Intimate partner violence     Fear of current or ex partner: None     Emotionally abused: None     Physically abused: None     Forced sexual activity: None   Other Topics Concern    None   Social History Narrative    None      History reviewed. No pertinent family history. PHYSICAL EXAM   VITAL SIGNS: BP (!) 142/82 Comment: per manual check left arm; pt intolerant of automatic BP cuff  Pulse 67   Temp 98.1 °F (36.7 °C) (Oral)   Resp 20   Ht 5' 3\" (1.6 m)   Wt 150 lb (68 kg)   SpO2 98%   BMI 26.57 kg/m²    Constitutional: Well developed, well nourished, no acute distress   Eyes: Pupils equally round and react to light, sclera nonicteric   HENT: Atraumatic, no trismus   Neck: supple, no JVD, no posterior neck tenderness   Respiratory: Lungs Clear, no retractions   Cardiovascular: Reg rate, no murmurs   Vascular: DP pulses 2+ equal bilaterally   GI: Soft, nontender, normal bowel sounds   Musculoskeletal: No edema, moderate swelling of lateral right elbow with good intact active flexion and extension ROM without obvious deformity   Integument: Well hydrated, no petechiae   Neurologic: Alert & oriented, no slurred speech, 5/5  strength and pedal strength.    Psych: Pleasant affect     EKG Interpretation   Interpreted by emergency department physician 3/18/21 19:47  Rhythm: Sinus   Rate: 72 BPM   Axis: normal   Ectopy: none   Conduction: normal   ST/T Segments: no acute change   Clinical Impression: Nonspecific     RADIOLOGY   XR ELBOW RIGHT (MIN 3 VIEWS)    (Results Pending)   XR RIBS RIGHT INCLUDE CHEST (MIN 3 VIEWS)    (Results Pending)        Labs Reviewed   CBC WITH AUTO DIFFERENTIAL - Abnormal; Notable for the following components:       Result guidance.   Electronically signed by: Sung Jackson, 3/18/2021 9:52 PM         Hayde Valdez MD  03/18/21 3063

## 2021-08-03 ENCOUNTER — APPOINTMENT (OUTPATIENT)
Dept: GENERAL RADIOLOGY | Age: 86
End: 2021-08-03
Payer: MEDICARE

## 2021-08-03 ENCOUNTER — HOSPITAL ENCOUNTER (EMERGENCY)
Age: 86
Discharge: HOME | End: 2021-08-03
Attending: EMERGENCY MEDICINE
Payer: MEDICARE

## 2021-08-03 VITALS
BODY MASS INDEX: 27.11 KG/M2 | HEART RATE: 84 BPM | RESPIRATION RATE: 16 BRPM | TEMPERATURE: 98.3 F | SYSTOLIC BLOOD PRESSURE: 155 MMHG | WEIGHT: 153 LBS | HEIGHT: 63 IN | OXYGEN SATURATION: 98 % | DIASTOLIC BLOOD PRESSURE: 68 MMHG

## 2021-08-03 DIAGNOSIS — J06.9 ACUTE UPPER RESPIRATORY INFECTION: Primary | ICD-10-CM

## 2021-08-03 LAB
ANION GAP SERPL CALCULATED.3IONS-SCNC: 16 MEQ/L (ref 8–16)
BACTERIA: ABNORMAL
BASOPHILS # BLD: 0.8 %
BASOPHILS ABSOLUTE: 0.1 THOU/MM3 (ref 0–0.1)
BILIRUBIN URINE: NEGATIVE
BLOOD, URINE: NEGATIVE
BUN BLDV-MCNC: 12 MG/DL (ref 7–22)
CALCIUM SERPL-MCNC: 9.2 MG/DL (ref 8.5–10.5)
CASTS: ABNORMAL /LPF
CASTS: ABNORMAL /LPF
CHARACTER, URINE: CLEAR
CHLORIDE BLD-SCNC: 96 MEQ/L (ref 98–111)
CO2: 22 MEQ/L (ref 23–33)
COLOR: YELLOW
CREAT SERPL-MCNC: 0.9 MG/DL (ref 0.4–1.2)
CRYSTALS: ABNORMAL
EKG ATRIAL RATE: 104 BPM
EKG P-R INTERVAL: 134 MS
EKG Q-T INTERVAL: 344 MS
EKG QRS DURATION: 84 MS
EKG QTC CALCULATION (BAZETT): 452 MS
EKG R AXIS: 11 DEGREES
EKG T AXIS: 49 DEGREES
EKG VENTRICULAR RATE: 104 BPM
EOSINOPHIL # BLD: 0.6 %
EOSINOPHILS ABSOLUTE: 0.1 THOU/MM3 (ref 0–0.4)
EPITHELIAL CELLS, UA: ABNORMAL /HPF
ERYTHROCYTE [DISTWIDTH] IN BLOOD BY AUTOMATED COUNT: 14 % (ref 11.5–14.5)
ERYTHROCYTE [DISTWIDTH] IN BLOOD BY AUTOMATED COUNT: 46.4 FL (ref 35–45)
FLU A ANTIGEN: NEGATIVE
FLU B ANTIGEN: NEGATIVE
GFR SERPL CREATININE-BSD FRML MDRD: 58 ML/MIN/1.73M2
GLUCOSE BLD-MCNC: 121 MG/DL (ref 70–108)
GLUCOSE, URINE: NEGATIVE MG/DL
HCT VFR BLD CALC: 41.5 % (ref 37–47)
HEMOGLOBIN: 13.1 GM/DL (ref 12–16)
IMMATURE GRANS (ABS): 0.05 THOU/MM3 (ref 0–0.07)
IMMATURE GRANULOCYTES: 0.4 %
KETONES, URINE: 15
LACTIC ACID: 1 MMOL/L (ref 0.5–2)
LEUKOCYTE EST, POC: NEGATIVE
LYMPHOCYTES # BLD: 12.7 %
LYMPHOCYTES ABSOLUTE: 1.7 THOU/MM3 (ref 1–4.8)
MCH RBC QN AUTO: 28.5 PG (ref 26–33)
MCHC RBC AUTO-ENTMCNC: 31.6 GM/DL (ref 32.2–35.5)
MCV RBC AUTO: 90.4 FL (ref 81–99)
MISCELLANEOUS LAB TEST RESULT: ABNORMAL
MONOCYTES # BLD: 7.6 %
MONOCYTES ABSOLUTE: 1 THOU/MM3 (ref 0.4–1.3)
NITRITE, URINE: NEGATIVE
NUCLEATED RED BLOOD CELLS: 0 /100 WBC
PH UA: 7.5 (ref 5–9)
PLATELET # BLD: 252 THOU/MM3 (ref 130–400)
PMV BLD AUTO: 11.4 FL (ref 9.4–12.4)
POTASSIUM REFLEX MAGNESIUM: 3.8 MEQ/L (ref 3.5–5.2)
PRO-BNP: 1598 PG/ML (ref 0–1800)
PROTEIN UA: ABNORMAL MG/DL
RBC # BLD: 4.59 MILL/MM3 (ref 4.2–5.4)
RBC URINE: ABNORMAL /HPF
RENAL EPITHELIAL, UA: ABNORMAL
SARS-COV-2, NAAT: NOT DETECTED
SEG NEUTROPHILS: 77.9 %
SEGMENTED NEUTROPHILS ABSOLUTE COUNT: 10.2 THOU/MM3 (ref 1.8–7.7)
SODIUM BLD-SCNC: 134 MEQ/L (ref 135–145)
SPECIFIC GRAVITY UA: 1.01 (ref 1–1.03)
TROPONIN T: < 0.01 NG/ML
UROBILINOGEN, URINE: 0.2 EU/DL (ref 0–1)
WBC # BLD: 13.1 THOU/MM3 (ref 4.8–10.8)
WBC UA: ABNORMAL /HPF
YEAST: ABNORMAL

## 2021-08-03 PROCEDURE — 84484 ASSAY OF TROPONIN QUANT: CPT

## 2021-08-03 PROCEDURE — 87635 SARS-COV-2 COVID-19 AMP PRB: CPT

## 2021-08-03 PROCEDURE — 81001 URINALYSIS AUTO W/SCOPE: CPT

## 2021-08-03 PROCEDURE — 80048 BASIC METABOLIC PNL TOTAL CA: CPT

## 2021-08-03 PROCEDURE — 2580000003 HC RX 258: Performed by: STUDENT IN AN ORGANIZED HEALTH CARE EDUCATION/TRAINING PROGRAM

## 2021-08-03 PROCEDURE — 85025 COMPLETE CBC W/AUTO DIFF WBC: CPT

## 2021-08-03 PROCEDURE — 93005 ELECTROCARDIOGRAM TRACING: CPT | Performed by: STUDENT IN AN ORGANIZED HEALTH CARE EDUCATION/TRAINING PROGRAM

## 2021-08-03 PROCEDURE — 36415 COLL VENOUS BLD VENIPUNCTURE: CPT

## 2021-08-03 PROCEDURE — 99284 EMERGENCY DEPT VISIT MOD MDM: CPT

## 2021-08-03 PROCEDURE — 83880 ASSAY OF NATRIURETIC PEPTIDE: CPT

## 2021-08-03 PROCEDURE — 71045 X-RAY EXAM CHEST 1 VIEW: CPT

## 2021-08-03 PROCEDURE — 87804 INFLUENZA ASSAY W/OPTIC: CPT

## 2021-08-03 PROCEDURE — 83605 ASSAY OF LACTIC ACID: CPT

## 2021-08-03 RX ORDER — 0.9 % SODIUM CHLORIDE 0.9 %
1000 INTRAVENOUS SOLUTION INTRAVENOUS ONCE
Status: COMPLETED | OUTPATIENT
Start: 2021-08-03 | End: 2021-08-03

## 2021-08-03 RX ADMIN — SODIUM CHLORIDE 1000 ML: 9 INJECTION, SOLUTION INTRAVENOUS at 15:24

## 2021-08-03 ASSESSMENT — ENCOUNTER SYMPTOMS
ABDOMINAL DISTENTION: 0
ANAL BLEEDING: 0
DIARRHEA: 0
TROUBLE SWALLOWING: 0
FACIAL SWELLING: 0
SINUS PRESSURE: 0
COLOR CHANGE: 0
NAUSEA: 0
RECTAL PAIN: 0
EYE PAIN: 0
EYE ITCHING: 0
EYE REDNESS: 0
COUGH: 1
SHORTNESS OF BREATH: 0
SINUS PAIN: 0
ABDOMINAL PAIN: 0
APNEA: 0
EYE DISCHARGE: 0
CHOKING: 0
BACK PAIN: 0
RHINORRHEA: 1
CHEST TIGHTNESS: 1
SORE THROAT: 0

## 2021-08-03 NOTE — ED TRIAGE NOTES
Pt arrives to ER room 23 via EMS. Pt states she couldn't get off toilet & daughter called squad. Daughter arrives & states she started a cough yesterday & last night she kept saying she wanted to go home normally she is coherent.   Today she said she couldn't walk

## 2021-08-03 NOTE — ED PROVIDER NOTES
Negative for arthralgias, back pain, joint swelling, myalgias, neck pain and neck stiffness. Skin: Negative for color change, rash and wound. Neurological: Positive for weakness. Negative for dizziness, seizures, syncope, facial asymmetry, speech difficulty and headaches. Hematological: Negative for adenopathy. Bruises/bleeds easily. Psychiatric/Behavioral: Negative for agitation, behavioral problems, hallucinations, self-injury and suicidal ideas. PAST MEDICAL AND SURGICAL HISTORY     Past Medical History:   Diagnosis Date    Hypertension      Past Surgical History:   Procedure Laterality Date    APPENDECTOMY      CARDIAC PACEMAKER PLACEMENT      CARDIAC PACEMAKER PLACEMENT       MEDICATIONS   No current facility-administered medications for this encounter. Current Outpatient Medications:     metoprolol succinate (TOPROL XL) 25 MG extended release tablet, Take 25 mg by mouth daily, Disp: , Rfl:     SOCIAL HISTORY     Social History     Social History Narrative    Not on file     Social History     Tobacco Use    Smoking status: Former Smoker    Smokeless tobacco: Never Used   Vaping Use    Vaping Use: Never used   Substance Use Topics    Alcohol use: Not Currently    Drug use: Never       ALLERGIES     Allergies   Allergen Reactions    Pcn [Penicillins]        FAMILY HISTORY   History reviewed. No pertinent family history. PREVIOUS RECORDS   Previous records reviewed: Was last seen in the emergency room on 3/18/2021 by Dr. Maude Crigler for rib contusion. Angeles Sanchez PHYSICAL EXAM     ED Triage Vitals [08/03/21 1301]   BP Temp Temp Source Pulse Resp SpO2 Height Weight   (!) 155/83 98.3 °F (36.8 °C) Oral 95 21 97 % 5' 3\" (1.6 m) 153 lb (69.4 kg)     Initial vital signs and nursing assessment reviewed and abnormal from Hypertension. Body mass index is 27.1 kg/m². Pulsoximetry is normal per my interpretation.     Additional Vital Signs:  Vitals:    08/03/21 1703   BP: (!) 155/68   Pulse: 84   Resp: 16 Temp:    SpO2:        Physical Exam  Constitutional:       General: She is not in acute distress. Appearance: Normal appearance. She is not ill-appearing or diaphoretic. HENT:      Head: Normocephalic and atraumatic. Right Ear: Tympanic membrane, ear canal and external ear normal.      Left Ear: Tympanic membrane, ear canal and external ear normal.      Nose: Nose normal. No congestion or rhinorrhea. Mouth/Throat:      Mouth: Mucous membranes are moist.      Pharynx: Oropharynx is clear. No oropharyngeal exudate or posterior oropharyngeal erythema. Eyes:      General: No scleral icterus. Extraocular Movements: Extraocular movements intact. Conjunctiva/sclera: Conjunctivae normal.      Pupils: Pupils are equal, round, and reactive to light. Cardiovascular:      Rate and Rhythm: Normal rate and regular rhythm. Pulses: Normal pulses. Heart sounds: Normal heart sounds. No murmur heard. No friction rub. No gallop. Pulmonary:      Effort: Pulmonary effort is normal. Tachypnea present. No respiratory distress. Breath sounds: Normal air entry. Examination of the right-lower field reveals rales. Examination of the left-lower field reveals rales. Rales present. Chest:      Chest wall: Tenderness present. Abdominal:      General: Abdomen is flat. Bowel sounds are normal. There is no distension. Palpations: Abdomen is soft. Tenderness: There is no abdominal tenderness. There is no guarding or rebound. Musculoskeletal:         General: No swelling, tenderness or deformity. Normal range of motion. Cervical back: Normal range of motion and neck supple. No rigidity or tenderness. Skin:     General: Skin is warm and dry. Capillary Refill: Capillary refill takes less than 2 seconds. Coloration: Skin is not jaundiced or pale. Findings: No bruising, erythema, lesion or rash. Neurological:      General: No focal deficit present.       Mental Status: She is alert and oriented to person, place, and time. Cranial Nerves: No cranial nerve deficit. Motor: Weakness present. Psychiatric:         Mood and Affect: Mood normal.         Behavior: Behavior normal.       MEDICAL DECISION MAKING   Initial Assessment:   1. Generalized weakness   2. Cough  3. Runny nose     Differential diagnosis include but is not limited to Covid, flu, pneumonia, UTI, cardiac causes, anemia, infection    Plan:    Chest x-ray   Covid, flu   UA   CBC, BMP   Troponin   IV fluids    Lactic acid     Patient's work-up for any concerning causes were done. Negative results. Patient most likely has an acute upper respiratory infection. Discharge patient with strict return precautions    ED RESULTS   Laboratory results:  Labs Reviewed   CBC WITH AUTO DIFFERENTIAL - Abnormal; Notable for the following components:       Result Value    WBC 13.1 (*)     MCHC 31.6 (*)     RDW-SD 46.4 (*)     Segs Absolute 10.2 (*)     All other components within normal limits   BASIC METABOLIC PANEL W/ REFLEX TO MG FOR LOW K - Abnormal; Notable for the following components:    Sodium 134 (*)     Chloride 96 (*)     CO2 22 (*)     Glucose 121 (*)     All other components within normal limits   GLOMERULAR FILTRATION RATE, ESTIMATED - Abnormal; Notable for the following components:    Est, Glom Filt Rate 58 (*)     All other components within normal limits   MICROSCOPIC URINALYSIS - Abnormal; Notable for the following components:    Ketones, Urine 15 (*)     Protein, UA TRACE (*)     All other components within normal limits   COVID-19, RAPID   RAPID INFLUENZA A/B ANTIGENS   TROPONIN   BRAIN NATRIURETIC PEPTIDE   ANION GAP   LACTIC ACID, PLASMA     Radiologic studies results:  XR CHEST PORTABLE   Final Result   1. No acute cardiopulmonary disease. **This report has been created using voice recognition software.   It may contain minor errors which are inherent in voice recognition technology. **      Final report electronically signed by Dr. Mari Johns on 8/3/2021 1:55 PM        ED Medications administered this visit:   Medications   0.9 % sodium chloride bolus (1,000 mLs Intravenous New Bag 8/3/21 1524)       ED COURSE     ED Course as of Aug 03 1810   Tue Aug 03, 2021   1405 WBC(!): 13.1 [EL]   1800 Patient's lab work and imaging were explained to her by Dr. Kaylin Quiñonez. Patient feels comfortable going home. [EL]      ED Course User Index  [EL] Zuly Gonzalez MD       Strict return precautions and follow up instructions were discussed with the patient prior to discharge, with which the patient agrees. MEDICATION CHANGES     New Prescriptions    No medications on file         FINAL DISPOSITION     Final diagnoses:   Acute upper respiratory infection     Condition: condition: stable  Dispo: Discharge to home      This transcription was electronically signed. Parts of this transcriptions may have been dictated by use of voice recognition software and electronically transcribed, and parts may have been transcribed with the assistance of an ED scribe. The transcription may contain errors not detected in proofreading. Please refer to my supervising physician's documentation if my documentation differs. Electronically Signed: Krystyna Perales MD, 08/03/21, 6:10 PM         Zuly Gonzalez MD  Resident  08/03/21 4422    Attending Supervising Physician's Attestation Statement  I performed a history and physical examination on the patient and discussed the management with the resident physician. I reviewed and agree with the findings and plan as documented in her note except below. Pt presents to the ER c/o URI symptoms, fatigued. Given IVF here, feeling better. I d/w patient and family, offered admission to the hospital however she declines. Pt ambulating with assistance which is unchanged from baseline.   Neurologic exam nonfocal, no evidence of cva, no evidence of bacterial infection, patient well appearing. Will dc home in accordance with patient's wishes.       Electronically signed by Gilma Baltazar MD on 8/4/21 at 10:21 PM NORRIS Solorzano MD  08/04/21 8963

## 2021-08-03 NOTE — ED NOTES
Pt refuses straight cath. External wick applied.   Denies pain or needs at this time      Rodrigo Allen RN  08/03/21 8879

## 2021-08-07 ENCOUNTER — HOSPITAL ENCOUNTER (INPATIENT)
Age: 86
LOS: 2 days | Discharge: HOME | DRG: 078 | End: 2021-08-09
Attending: EMERGENCY MEDICINE | Admitting: INTERNAL MEDICINE
Payer: MEDICARE

## 2021-08-07 ENCOUNTER — APPOINTMENT (OUTPATIENT)
Dept: GENERAL RADIOLOGY | Age: 86
DRG: 078 | End: 2021-08-07
Payer: MEDICARE

## 2021-08-07 ENCOUNTER — APPOINTMENT (OUTPATIENT)
Dept: CT IMAGING | Age: 86
DRG: 078 | End: 2021-08-07
Payer: MEDICARE

## 2021-08-07 DIAGNOSIS — E87.1 HYPONATREMIA: ICD-10-CM

## 2021-08-07 DIAGNOSIS — E86.0 DEHYDRATION: ICD-10-CM
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
## 2021-08-07 DIAGNOSIS — S09.90XA CLOSED HEAD INJURY, INITIAL ENCOUNTER: Primary | ICD-10-CM

## 2021-08-07 DIAGNOSIS — R77.8 ELEVATED TROPONIN: ICD-10-CM

## 2021-08-07 PROBLEM — R41.0 CONFUSION: Status: ACTIVE | Noted: 2021-08-07

## 2021-08-07 LAB
ANION GAP SERPL CALCULATED.3IONS-SCNC: 13 MEQ/L (ref 8–16)
BASOPHILS # BLD: 1.1 %
BASOPHILS ABSOLUTE: 0.1 THOU/MM3 (ref 0–0.1)
BILIRUBIN URINE: NEGATIVE
BLOOD, URINE: NEGATIVE
BUN BLDV-MCNC: 12 MG/DL (ref 7–22)
CALCIUM SERPL-MCNC: 8.9 MG/DL (ref 8.5–10.5)
CHARACTER, URINE: CLEAR
CHLORIDE BLD-SCNC: 96 MEQ/L (ref 98–111)
CO2: 22 MEQ/L (ref 23–33)
COLOR: YELLOW
CREAT SERPL-MCNC: 0.7 MG/DL (ref 0.4–1.2)
EOSINOPHIL # BLD: 3.1 %
EOSINOPHILS ABSOLUTE: 0.3 THOU/MM3 (ref 0–0.4)
ERYTHROCYTE [DISTWIDTH] IN BLOOD BY AUTOMATED COUNT: 14.2 % (ref 11.5–14.5)
ERYTHROCYTE [DISTWIDTH] IN BLOOD BY AUTOMATED COUNT: 45.9 FL (ref 35–45)
GFR SERPL CREATININE-BSD FRML MDRD: 78 ML/MIN/1.73M2
GLUCOSE BLD-MCNC: 109 MG/DL (ref 70–108)
GLUCOSE URINE: NEGATIVE MG/DL
HCT VFR BLD CALC: 36 % (ref 37–47)
HEMOGLOBIN: 11.7 GM/DL (ref 12–16)
IMMATURE GRANS (ABS): 0.02 THOU/MM3 (ref 0–0.07)
IMMATURE GRANULOCYTES: 0.2 %
INR BLD: 1.05 (ref 0.85–1.13)
KETONES, URINE: NEGATIVE
LEUKOCYTE ESTERASE, URINE: NEGATIVE
LYMPHOCYTES # BLD: 32.1 %
LYMPHOCYTES ABSOLUTE: 2.6 THOU/MM3 (ref 1–4.8)
MCH RBC QN AUTO: 28.5 PG (ref 26–33)
MCHC RBC AUTO-ENTMCNC: 32.5 GM/DL (ref 32.2–35.5)
MCV RBC AUTO: 87.8 FL (ref 81–99)
MONOCYTES # BLD: 10.6 %
MONOCYTES ABSOLUTE: 0.9 THOU/MM3 (ref 0.4–1.3)
NITRITE, URINE: NEGATIVE
NUCLEATED RED BLOOD CELLS: 0 /100 WBC
OSMOLALITY CALCULATION: 263 MOSMOL/KG (ref 275–300)
PH UA: 6.5 (ref 5–9)
PLATELET # BLD: 289 THOU/MM3 (ref 130–400)
PMV BLD AUTO: 10.7 FL (ref 9.4–12.4)
POTASSIUM SERPL-SCNC: 4.2 MEQ/L (ref 3.5–5.2)
PROTEIN UA: NEGATIVE
RBC # BLD: 4.1 MILL/MM3 (ref 4.2–5.4)
SEG NEUTROPHILS: 52.9 %
SEGMENTED NEUTROPHILS ABSOLUTE COUNT: 4.3 THOU/MM3 (ref 1.8–7.7)
SODIUM BLD-SCNC: 131 MEQ/L (ref 135–145)
SPECIFIC GRAVITY, URINE: 1.01 (ref 1–1.03)
TROPONIN T: 0.07 NG/ML
TROPONIN T: 0.07 NG/ML
UROBILINOGEN, URINE: 1 EU/DL (ref 0–1)
WBC # BLD: 8.1 THOU/MM3 (ref 4.8–10.8)

## 2021-08-07 PROCEDURE — P9612 CATHETERIZE FOR URINE SPEC: HCPCS

## 2021-08-07 PROCEDURE — 99285 EMERGENCY DEPT VISIT HI MDM: CPT

## 2021-08-07 PROCEDURE — 6360000002 HC RX W HCPCS: Performed by: STUDENT IN AN ORGANIZED HEALTH CARE EDUCATION/TRAINING PROGRAM

## 2021-08-07 PROCEDURE — 2580000003 HC RX 258: Performed by: EMERGENCY MEDICINE

## 2021-08-07 PROCEDURE — 2580000003 HC RX 258: Performed by: STUDENT IN AN ORGANIZED HEALTH CARE EDUCATION/TRAINING PROGRAM

## 2021-08-07 PROCEDURE — 70450 CT HEAD/BRAIN W/O DYE: CPT

## 2021-08-07 PROCEDURE — 2060000000 HC ICU INTERMEDIATE R&B

## 2021-08-07 PROCEDURE — 2500000003 HC RX 250 WO HCPCS: Performed by: STUDENT IN AN ORGANIZED HEALTH CARE EDUCATION/TRAINING PROGRAM

## 2021-08-07 PROCEDURE — 84484 ASSAY OF TROPONIN QUANT: CPT

## 2021-08-07 PROCEDURE — 96361 HYDRATE IV INFUSION ADD-ON: CPT

## 2021-08-07 PROCEDURE — 99223 1ST HOSP IP/OBS HIGH 75: CPT | Performed by: INTERNAL MEDICINE

## 2021-08-07 PROCEDURE — 6370000000 HC RX 637 (ALT 250 FOR IP): Performed by: STUDENT IN AN ORGANIZED HEALTH CARE EDUCATION/TRAINING PROGRAM

## 2021-08-07 PROCEDURE — 80048 BASIC METABOLIC PNL TOTAL CA: CPT

## 2021-08-07 PROCEDURE — 36415 COLL VENOUS BLD VENIPUNCTURE: CPT

## 2021-08-07 PROCEDURE — 96360 HYDRATION IV INFUSION INIT: CPT

## 2021-08-07 PROCEDURE — 81003 URINALYSIS AUTO W/O SCOPE: CPT

## 2021-08-07 PROCEDURE — 71045 X-RAY EXAM CHEST 1 VIEW: CPT

## 2021-08-07 PROCEDURE — 93005 ELECTROCARDIOGRAM TRACING: CPT | Performed by: STUDENT IN AN ORGANIZED HEALTH CARE EDUCATION/TRAINING PROGRAM

## 2021-08-07 PROCEDURE — 85610 PROTHROMBIN TIME: CPT

## 2021-08-07 PROCEDURE — 85025 COMPLETE CBC W/AUTO DIFF WBC: CPT

## 2021-08-07 PROCEDURE — 72125 CT NECK SPINE W/O DYE: CPT

## 2021-08-07 RX ORDER — METOPROLOL SUCCINATE 50 MG/1
50 TABLET, EXTENDED RELEASE ORAL DAILY
Status: DISCONTINUED | OUTPATIENT
Start: 2021-08-08 | End: 2021-08-07

## 2021-08-07 RX ORDER — POLYETHYLENE GLYCOL 3350 17 G/17G
17 POWDER, FOR SOLUTION ORAL DAILY PRN
Status: DISCONTINUED | OUTPATIENT
Start: 2021-08-07 | End: 2021-08-09 | Stop reason: HOSPADM

## 2021-08-07 RX ORDER — ACETAMINOPHEN 650 MG/1
650 SUPPOSITORY RECTAL EVERY 6 HOURS PRN
Status: DISCONTINUED | OUTPATIENT
Start: 2021-08-07 | End: 2021-08-09 | Stop reason: HOSPADM

## 2021-08-07 RX ORDER — METOPROLOL SUCCINATE 50 MG/1
50 TABLET, EXTENDED RELEASE ORAL DAILY
Status: DISCONTINUED | OUTPATIENT
Start: 2021-08-07 | End: 2021-08-09 | Stop reason: HOSPADM

## 2021-08-07 RX ORDER — SODIUM CHLORIDE 9 MG/ML
1000 INJECTION, SOLUTION INTRAVENOUS CONTINUOUS
Status: DISCONTINUED | OUTPATIENT
Start: 2021-08-07 | End: 2021-08-07

## 2021-08-07 RX ORDER — SODIUM CHLORIDE 9 MG/ML
INJECTION, SOLUTION INTRAVENOUS CONTINUOUS
Status: DISCONTINUED | OUTPATIENT
Start: 2021-08-07 | End: 2021-08-09 | Stop reason: HOSPADM

## 2021-08-07 RX ORDER — ONDANSETRON 4 MG/1
4 TABLET, ORALLY DISINTEGRATING ORAL EVERY 8 HOURS PRN
Status: DISCONTINUED | OUTPATIENT
Start: 2021-08-07 | End: 2021-08-09 | Stop reason: HOSPADM

## 2021-08-07 RX ORDER — SODIUM CHLORIDE 0.9 % (FLUSH) 0.9 %
5-40 SYRINGE (ML) INJECTION PRN
Status: DISCONTINUED | OUTPATIENT
Start: 2021-08-07 | End: 2021-08-09 | Stop reason: HOSPADM

## 2021-08-07 RX ORDER — AMLODIPINE BESYLATE 5 MG/1
5 TABLET ORAL DAILY
Status: DISCONTINUED | OUTPATIENT
Start: 2021-08-07 | End: 2021-08-07

## 2021-08-07 RX ORDER — SODIUM CHLORIDE 9 MG/ML
25 INJECTION, SOLUTION INTRAVENOUS PRN
Status: DISCONTINUED | OUTPATIENT
Start: 2021-08-07 | End: 2021-08-09 | Stop reason: HOSPADM

## 2021-08-07 RX ORDER — SODIUM CHLORIDE 0.9 % (FLUSH) 0.9 %
5-40 SYRINGE (ML) INJECTION EVERY 12 HOURS SCHEDULED
Status: DISCONTINUED | OUTPATIENT
Start: 2021-08-07 | End: 2021-08-09 | Stop reason: HOSPADM

## 2021-08-07 RX ORDER — ONDANSETRON 2 MG/ML
4 INJECTION INTRAMUSCULAR; INTRAVENOUS EVERY 6 HOURS PRN
Status: DISCONTINUED | OUTPATIENT
Start: 2021-08-07 | End: 2021-08-09 | Stop reason: HOSPADM

## 2021-08-07 RX ORDER — PANTOPRAZOLE SODIUM 40 MG/1
40 TABLET, DELAYED RELEASE ORAL
Status: DISCONTINUED | OUTPATIENT
Start: 2021-08-08 | End: 2021-08-09 | Stop reason: HOSPADM

## 2021-08-07 RX ORDER — AMLODIPINE BESYLATE 5 MG/1
5 TABLET ORAL DAILY
Status: DISCONTINUED | OUTPATIENT
Start: 2021-08-07 | End: 2021-08-08

## 2021-08-07 RX ORDER — ACETAMINOPHEN 325 MG/1
650 TABLET ORAL EVERY 6 HOURS PRN
Status: DISCONTINUED | OUTPATIENT
Start: 2021-08-07 | End: 2021-08-09 | Stop reason: HOSPADM

## 2021-08-07 RX ADMIN — METOPROLOL SUCCINATE 50 MG: 50 TABLET, FILM COATED, EXTENDED RELEASE ORAL at 20:42

## 2021-08-07 RX ADMIN — SODIUM CHLORIDE 1000 ML: 9 INJECTION, SOLUTION INTRAVENOUS at 15:53

## 2021-08-07 RX ADMIN — ENOXAPARIN SODIUM 40 MG: 40 INJECTION SUBCUTANEOUS at 21:14

## 2021-08-07 RX ADMIN — AMLODIPINE BESYLATE 5 MG: 5 TABLET ORAL at 20:39

## 2021-08-07 RX ADMIN — SODIUM CHLORIDE: 9 INJECTION, SOLUTION INTRAVENOUS at 19:56

## 2021-08-07 RX ADMIN — DEXTROSE MONOHYDRATE 5 MG/HR: 50 INJECTION, SOLUTION INTRAVENOUS at 22:53

## 2021-08-07 ASSESSMENT — PAIN SCALES - GENERAL
PAINLEVEL_OUTOF10: 0
PAINLEVEL_OUTOF10: 0

## 2021-08-07 ASSESSMENT — ENCOUNTER SYMPTOMS
SHORTNESS OF BREATH: 0
VOMITING: 0
CHEST TIGHTNESS: 0
EYE REDNESS: 0
DIARRHEA: 0
SORE THROAT: 0
RHINORRHEA: 0
WHEEZING: 0
COUGH: 0
NAUSEA: 0
ABDOMINAL PAIN: 0
BACK PAIN: 0

## 2021-08-07 NOTE — H&P
History & Physical       Patient: Sharon Dennison  YOB: 1928    MRN: [id number]     Acct: [de-identified]    PCP: Lencho Siegel. SHAMA Amezquita CNP    Date of Admission: 8/7/2021    Date of Service: Patient seen / examined on 08/07/21 and admitted to Inpatient with expected LOS greater than two midnights due to medical therapy. ASSESSMENT / PLAN:    1. Hypertensive urgency - history of hypertension. Blood pressure elevated with SBP > 180 with signs of confusion. No change in vision, no headache. No acute findings on CT head scan. Restart home Toprol XL and increase to 50 mg. Will add Norvasc 5 mg. Continue to monitor blood pressure closely. 2. Elevated Troponin - 0.075 on presentation. Unable to assess for chest pain/shortness of breath, patient is a poor historian. Troponin from 8/3 normal. Initial EKG reveals sinus tachycardia. The Elevated troponin could be 2/2 to hypertension.         - Will repeat Troponin level x2, obtain EKG, obtain Echo to evaluate heart function and structure. If troponin continued to trend up, start Heparin gtt and consult cardio. 3. Witnessed mechanical fall - patient fell forward, striking her head on a door frame. No loss of consciousness. Not on any blood thinners. CT head does not reveal any acute intracranial bleed. Suspecting 2/2 to dehydration. Neuro checks q4h. Check orthostatics. 3. Altered mental status - unknown etiology but suspecting 2/2 to dehydration/hypertension. Urine culture->negative. CXR negative for pneumonia/effusion. Continue to hydrate with IV normal saline at 125 cc/hr. Chief Complaint:  Fall/Confusion    History of Present Illness:  80 y.o. female who presented to [place] [id number] for evaluation s/p witnessed fall and confusion. PMHx includes Hypertension. Daughter at bedside provides history, patient is confused and hard of hearing. Daughter states that patient fell forward and struck her head on the door frame.  Did not loose consciousness, no blood thinners. Patient has been more confused for the past couple of days. She presented to [facility] ED on 8/3/21 for evaluation of weakness and c/o of upper respiratory cough and congestion. At the time CXR did not reveals any acute findings. She was found to have URI 2/2 to possible viral etiology. ED course: Given 1L normal saline. Lab significant for Sodium level 131, serum osmolality 263, Troponin elevated at 0.075. CXR does not reveal acute abnormality. CT head->negative for any acute abnormalities. CT cervical spine->negative for fracture. Urine culture->negative. Past Medical History:    Past Medical History:   Diagnosis Date    Hypertension      Past Surgical History:    Past Surgical History:   Procedure Laterality Date    APPENDECTOMY      CARDIAC PACEMAKER PLACEMENT      CARDIAC PACEMAKER PLACEMENT        Medications Prior to Admission:   No current facility-administered medications on file prior to encounter. Current Outpatient Medications on File Prior to Encounter   Medication Sig Dispense Refill    metoprolol succinate (TOPROL XL) 25 MG extended release tablet Take 25 mg by mouth daily       Allergies:   Pcn [penicillins]    Social History:   Social History     Socioeconomic History    Marital status:       Spouse name: Not on file    Number of children: Not on file    Years of education: Not on file    Highest education level: Not on file   Occupational History    Not on file   Tobacco Use    Smoking status: Former Smoker    Smokeless tobacco: Never Used   Vaping Use    Vaping Use: Never used   Substance and Sexual Activity    Alcohol use: Not Currently    Drug use: Never    Sexual activity: Not on file   Other Topics Concern    Not on file   Social History Narrative    Not on file     Social Determinants of Health     Financial Resource Strain:     Difficulty of Paying Living Expenses:    Food Insecurity:     Worried About Running Out of Food in the Last Year:    [address] in the Last Year:    Transportation Needs:     Lack of Transportation (Medical):  Lack of Transportation (Non-Medical):    Physical Activity:     Days of Exercise per Week:     Minutes of Exercise per Session:    Stress:     Feeling of Stress :    Social Connections:     Frequency of Communication with Friends and Family:     Frequency of Social Gatherings with Friends and Family:     Attends Faith Services:     Active Member of Clubs or Organizations:     Attends Club or Organization Meetings:     Marital Status:    Intimate Partner Violence:     Fear of Current or Ex-Partner:     Emotionally Abused:     Physically Abused:     Sexually Abused:      Family History:    No family history on file. REVIEW OF SYSTEMS:  A 14-point ROS was obtained and negative, with the exception of pertinent positives as listed below:        PHYSICAL EXAM:  Vitals:    08/07/21 1454 08/07/21 1506 08/07/21 1752   BP: (!) 191/68 (!) 172/69 (!) 179/72   Pulse: 68  72   Resp: 17     Temp: 97.8 °F (36.6 °C)     TempSrc: Oral     SpO2: 96%     Weight:   150 lb (68 kg)   Height:   5' 3\" (1.6 m)     General appearance: Chronically ill elderly female  HEENT:  Normocephalic / atraumatic. PERRL. EOM intact. Conjunctivae appear normal.  Neck: Supple. No JVD. Respiratory: Normal respiratory effort on RA. CTAB. No wheezes / rales / rhonchi. Cardiovascular: RRR. Normal S1/S2. No murmurs / rubs / gallops. Abdomen: Soft / non-tender / non-distended. BS present. Musculoskeletal: No cyanosis or edema. Skin: Warm / dry. Normal turgor. Neurologic: Not oriented to time/place but oriented to self. CN intact. No obvious focal neurologic deficits. Psychiatric: Thought content / judgment / insight appear appropriate. Capillary refill: Brisk bilaterally. Peripheral pulses: +2 bilaterally.     Labs:   Results for orders placed or performed during the hospital encounter of 08/07/21 CBC auto differential   Result Value Ref Range    WBC 8.1 4.8 - 10.8 thou/mm3    RBC 4.10 (L) 4.20 - 5.40 mill/mm3    Hemoglobin 11.7 (L) 12.0 - 16.0 gm/dl    Hematocrit 36.0 (L) 37.0 - 47.0 %    MCV 87.8 81.0 - 99.0 fL    MCH 28.5 26.0 - 33.0 pg    MCHC 32.5 32.2 - 35.5 gm/dl    RDW-CV 14.2 11.5 - 14.5 %    RDW-SD 45.9 (H) 35.0 - 45.0 fL    Platelets 914 242 - 344 thou/mm3    MPV 10.7 9.4 - 12.4 fL    Seg Neutrophils 52.9 %    Lymphocytes 32.1 %    Monocytes 10.6 %    Eosinophils 3.1 %    Basophils 1.1 %    Immature Granulocytes 0.2 %    Segs Absolute 4.3 1 - 7 thou/mm3    Lymphocytes Absolute 2.6 1.0 - 4.8 thou/mm3    Monocytes Absolute 0.9 0.4 - 1.3 thou/mm3    Eosinophils Absolute 0.3 0.0 - 0.4 thou/mm3    Basophils Absolute 0.1 0.0 - 0.1 thou/mm3    Immature Grans (Abs) 0.02 0.00 - 0.07 thou/mm3    nRBC 0 /100 wbc   Basic Metabolic Panel   Result Value Ref Range    Sodium 131 (L) 135 - 145 meq/L    Potassium 4.2 3.5 - 5.2 meq/L    Chloride 96 (L) 98 - 111 meq/L    CO2 22 (L) 23 - 33 meq/L    Glucose 109 (H) 70 - 108 mg/dL    BUN 12 7 - 22 mg/dL    CREATININE 0.7 0.4 - 1.2 mg/dL    Calcium 8.9 8.5 - 10.5 mg/dL   Protime-INR   Result Value Ref Range    INR 1.05 0.85 - 1.13   Troponin   Result Value Ref Range    Troponin T 0.074 (A) ng/ml   Anion Gap   Result Value Ref Range    Anion Gap 13.0 8.0 - 16.0 meq/L   Glomerular Filtration Rate, Estimated   Result Value Ref Range    Est, Glom Filt Rate 78 (A) ml/min/1.73m2   Osmolality   Result Value Ref Range    Osmolality Calc 263.0 (L) 275.0 - 300.0 mOsmol/kg   Troponin   Result Value Ref Range    Troponin T 0.075 (A) ng/ml       EKG / Radiology:     EKG:  Reviewed by me --    CXR:   Reviewed by me --    CT Head WO Contrast    Result Date: 8/7/2021  PROCEDURE: CT HEAD WO CONTRAST CLINICAL INFORMATION: 80year-old female who fell. COMPARISON: Head CT 11/10/2020. TECHNIQUE: Noncontrast 5 mm axial images were obtained through the brain.  All CT scans at this facility use dose modulation, iterative reconstruction, and/or weight-based dosing when appropriate to reduce radiation dose to as low as reasonably achievable. FINDINGS: There is generalized prominence of the sulci and gyri consistent with age-related volume loss. There is no hemorrhage. There are no intra-or extra-axial collections. There is no hydrocephalus, midline shift or mass effect. The gray-white matter differentiation is preserved. There is hypoattenuation throughout the white matter consistent with chronic microvascular ischemic disease. There is mucosal thickening in the maxillary sinuses and the ethmoid air cells. The paranasal sinuses and mastoid air cells are otherwise clear. There is no suspicious calvarial abnormality. 1. No acute intracranial hemorrhage, mass effect or midline shift. 2. Chronic senescent changes of the brain including generalized atrophy and chronic microvascular ischemic changes in the white matter. **This report has been created using voice recognition software. It may contain minor errors which are inherent in voice recognition technology. ** Final report electronically signed by Dr Floyd Benitez on 8/7/2021 3:53 PM    CT Cervical Spine WO Contrast    Result Date: 8/7/2021  PROCEDURE: CT CERVICAL SPINE WO CONTRAST CLINICAL INFORMATION: 26-year-old female who fell. Head injury. COMPARISON: No prior study. TECHNIQUE: 3 mm noncontrast axial images were obtained through the cervical spine with sagittal and coronal reconstructions. All CT scans at this facility use dose modulation, iterative reconstruction, and/or weight-based dosing when appropriate to reduce radiation dose to as low as reasonably achievable. FINDINGS: The cervical vertebral body heights are preserved. There is no acute compression fracture. There is grade 1 anterolisthesis of C4 over C5. There is disc space narrowing at C5-C6 and C6-C7. There is diffuse facet arthropathy.  There is no prevertebral soft tissue swelling. The lateral masses of C1 and C2 are appropriately aligned. The visualized lung apices are within normal limits. Degenerative changes with no acute fracture. **This report has been created using voice recognition software. It may contain minor errors which are inherent in voice recognition technology. ** Final report electronically signed by Dr Enriquez Shows on 8/7/2021 3:56 PM    XR CHEST PORTABLE    Result Date: 8/7/2021  PROCEDURE: XR CHEST PORTABLE CLINICAL INFORMATION: 51-year-old female who fell. COMPARISON: Chest x-ray 08/03/2021. TECHNIQUE: AP upright view of the chest was obtained. FINDINGS: There is a dual-chamber cardiac device over the left hemithorax. The cardiac silhouette and pulmonary vasculature are within normal limits. There is atherosclerosis in the thoracic aorta. There is no significant pleural effusion or pneumothorax. Visualized portions of the upper abdomen are within normal limits. The osseous structures are intact. There are some degenerative changes at the shoulders. No acute fractures or suspicious osseous lesions. There is no acute intrathoracic process. **This report has been created using voice recognition software. It may contain minor errors which are inherent in voice recognition technology. ** Final report electronically signed by Dr Enriquez Shows on 8/7/2021 3:42 PM    XR CHEST PORTABLE    Result Date: 8/3/2021  PROCEDURE: XR CHEST PORTABLE CLINICAL INFORMATION: cough COMPARISON: 3/18/2021 TECHNIQUE: AP portable chest radiograph performed. FINDINGS: POSTSURGICAL CHANGES: None. LINES/TUBES/MECHANICAL DEVICES: None. TRACHEA/HEART/MEDIASTINUM/HILUM: Unremarkable. LUNG LYONS: Normal. OTHER: Left-sided dual-lead pacemaker remains in place. PNEUMOTHORAX:  None. OSSEOUS STRUCTURES:  No acute osseous abnormality. 1. No acute cardiopulmonary disease. **This report has been created using voice recognition software.   It may contain minor errors which are inherent in voice recognition technology. ** Final report electronically signed by Dr. Jonathon Sharp on 8/3/2021 1:55 PM    FEN/GI/DVT:  IVF: NS @ 125  Electrolytes: Monitor and replace per protocols  Diet: General  GI PPX: Yes  DVT Prophylaxis: Lovenox    CODE STATUS:  Full    Thank you SHAMA Sabillon CNP for the opportunity to be involved in this patient's care.     Electronically signed by Kassi Chi MD PGY-2 on 8/7/2021 at 5:56 PM

## 2021-08-07 NOTE — ED NOTES
Bed: 018A  Expected date: 8/7/21  Expected time: 2:33 PM  Means of arrival: [facility]  Comments:     Sasha Yao RN  08/07/21 [id number]

## 2021-08-07 NOTE — ED PROVIDER NOTES
[facility] ENCOUNTER          Pt Name: Traci Chaney  MRN: [id number]  [place] 1/21/1928  Date of evaluation: 8/7/2021  Emergency Physician: Bianka More DO    CHIEF COMPLAINT       Chief Complaint   Patient presents with    Fall     History obtained from patient and daughter. HISTORY OF PRESENT ILLNESS    HPI  Traci Chaney is a 80 y.o. female who presents to the emergency department for evaluation of fall. Patient recently in the ED for upper respiratory tract infection and generalized weakness. She was discharged home after reassuring work-up. She has since had intermittent episodes of confusion. Today she was ambulating with her walker. She fell forward and struck her face on the door frame. This occurred approximately 1:30 PM.  No LOC. Patient was noted to have confusion and slurred speech by daughter and EMS. No noted focal neuro deficits. The patient has no other acute complaints at this time. REVIEW OF SYSTEMS   Review of Systems   Constitutional: Negative for chills and fever. HENT: Negative for congestion, rhinorrhea and sore throat. Eyes: Negative for redness and visual disturbance. Respiratory: Negative for cough, chest tightness, shortness of breath and wheezing. Cardiovascular: Negative for chest pain and leg swelling. Gastrointestinal: Negative for abdominal pain, diarrhea, nausea and vomiting. Endocrine: Negative for polydipsia and polyuria. Genitourinary: Negative for decreased urine volume, dysuria and urgency. Musculoskeletal: Negative for back pain and myalgias. Skin: Negative for rash and wound. Neurological: Negative for light-headedness and headaches. Hematological: Does not bruise/bleed easily. Psychiatric/Behavioral: Positive for confusion and decreased concentration. Negative for dysphoric mood.          PAST MEDICAL AND SURGICAL HISTORY     Past Medical History:   Diagnosis [Penicillins]          FAMILY HISTORY   No family history on file. PHYSICAL EXAM     ED Triage Vitals   BP Temp Temp Source Pulse Resp SpO2 Height Weight   08/07/21 1454 08/07/21 1454 08/07/21 1454 08/07/21 1454 08/07/21 1454 08/07/21 1454 08/07/21 1752 08/07/21 1752   (!) 191/68 97.8 °F (36.6 °C) Oral 68 17 96 % 5' 3\" (1.6 m) 150 lb (68 kg)         Additional Vital Signs:  Vitals:    08/07/21 1834   BP: (!) 187/89   Pulse:    Resp:    Temp:    SpO2:        Physical Exam  Constitutional:       General: She is not in acute distress. Appearance: She is well-developed. She is not diaphoretic. HENT:      Head: Normocephalic and atraumatic. Nose: No congestion or rhinorrhea. Eyes:      General:         Right eye: No discharge. Left eye: No discharge. Conjunctiva/sclera: Conjunctivae normal.      Pupils: Pupils are equal, round, and reactive to light. Neck:      Thyroid: No thyromegaly. Vascular: No JVD. Cardiovascular:      Rate and Rhythm: Normal rate and regular rhythm. Heart sounds: Normal heart sounds. Pulmonary:      Effort: Pulmonary effort is normal. No respiratory distress. Breath sounds: Normal breath sounds. Abdominal:      General: Bowel sounds are normal. There is no distension. Palpations: Abdomen is soft. Tenderness: There is no abdominal tenderness. Musculoskeletal:         General: No tenderness. Normal range of motion. Cervical back: Normal range of motion and neck supple. Lymphadenopathy:      Cervical: No cervical adenopathy. Skin:     General: Skin is warm and dry. Capillary Refill: Capillary refill takes less than 2 seconds. Findings: No rash. Neurological:      Mental Status: She is alert and oriented to person, place, and time. She is not disoriented. GCS: GCS eye subscore is 4. GCS verbal subscore is 5. GCS motor subscore is 6. Cranial Nerves: No cranial nerve deficit or facial asymmetry.       Sensory: Sensation is intact. No sensory deficit. Motor: Motor function is intact. No abnormal muscle tone or seizure activity. Gait: Gait normal.      Comments: Upon initial arrival patient with decreased mental status requiring frequent redirection and confusion. Oriented to self only. During ED observation patient's mental status progressively improved AO x3. Initial vital signs and nursing assessment reviewed and abnormal from Hypertension. .   Pulsoximetry is normal per my interpretation. MEDICAL DECISION MAKING   Initial Assessment: Given the patient's above chief complaint and findings on history and physical examination, I thought it was appropriate to consider the following emergency medical conditions: Closed head injury, ICH, TIA, CVA, metabolic encephalopathy although some of these diagnoses are unlikely they were considered in my medical decision making.     Plan: CBC, BMP, ECG, head CT, symptomatic treatment with gentle hydration, and reassess         ED RESULTS   Laboratory results:  Labs Reviewed   CBC WITH AUTO DIFFERENTIAL - Abnormal; Notable for the following components:       Result Value    RBC 4.10 (*)     Hemoglobin 11.7 (*)     Hematocrit 36.0 (*)     RDW-SD 45.9 (*)     All other components within normal limits   BASIC METABOLIC PANEL - Abnormal; Notable for the following components:    Sodium 131 (*)     Chloride 96 (*)     CO2 22 (*)     Glucose 109 (*)     All other components within normal limits   TROPONIN - Abnormal; Notable for the following components:    Troponin T 0.074 (*)     All other components within normal limits   GLOMERULAR FILTRATION RATE, ESTIMATED - Abnormal; Notable for the following components:    Est, Glom Filt Rate 78 (*)     All other components within normal limits   OSMOLALITY - Abnormal; Notable for the following components:    Osmolality Calc 263.0 (*)     All other components within normal limits   TROPONIN - Abnormal; Notable for the following components:    Troponin T 0.075 (*)     All other components within normal limits   URINE RT REFLEX TO CULTURE   PROTIME-INR   ANION GAP   TROPONIN   TROPONIN   CBC   BASIC METABOLIC PANEL       Radiologic studies results:  CT Head WO Contrast   Final Result      1. No acute intracranial hemorrhage, mass effect or midline shift. 2. Chronic senescent changes of the brain including generalized atrophy and chronic microvascular ischemic changes in the white matter. **This report has been created using voice recognition software. It may contain minor errors which are inherent in voice recognition technology. **      Final report electronically signed by Dr Denys Crandall on 8/7/2021 3:53 PM      CT Cervical Spine WO Contrast   Final Result   Degenerative changes with no acute fracture. **This report has been created using voice recognition software. It may contain minor errors which are inherent in voice recognition technology. **      Final report electronically signed by Dr Denys Crandall on 8/7/2021 3:56 PM      XR CHEST PORTABLE   Final Result   There is no acute intrathoracic process. **This report has been created using voice recognition software. It may contain minor errors which are inherent in voice recognition technology. **      Final report electronically signed by Dr Denys Crandall on 8/7/2021 3:42 PM          ED Medications administered this visit:   Medications   sodium chloride flush 0.9 % injection 5-40 mL (has no administration in time range)   sodium chloride flush 0.9 % injection 5-40 mL (has no administration in time range)   0.9 % sodium chloride infusion (has no administration in time range)   enoxaparin (LOVENOX) injection 40 mg (has no administration in time range)   ondansetron (ZOFRAN-ODT) disintegrating tablet 4 mg (has no administration in time range)     Or   ondansetron (ZOFRAN) injection 4 mg (has no administration in time range) polyethylene glycol (GLYCOLAX) packet 17 g (has no administration in time range)   acetaminophen (TYLENOL) tablet 650 mg (has no administration in time range)     Or   acetaminophen (TYLENOL) suppository 650 mg (has no administration in time range)   0.9 % sodium chloride infusion (has no administration in time range)   amLODIPine (NORVASC) tablet 5 mg (has no administration in time range)   metoprolol succinate (TOPROL XL) extended release tablet 50 mg (has no administration in time range)   pantoprazole (PROTONIX) tablet 40 mg (has no administration in time range)         ED COURSE     ED Course as of Aug 07 1926   Sat Aug 07, 2021   1712 Patient's mental status improved. [DD]   1926 ECG no ischemic changes. Sodium mild hyponatremia. These are likely from dehydration. Troponin T(!): 0.075 [DD]   1926 No TPA as patient with NIH 0 and history of head injury. CT Head WO Contrast [DD]      ED Course User Index  [DD] Rober Kramer DO         The diagnosis, extensive differential diagnosis, laboratory and imaging findings were discussed at the bedside. The patient and daughter were an active participant in their care. They are agreeable to the plan of care. All questions and concerns were addressed at the time of the encounter. MEDICATION CHANGES     DISCHARGE MEDICATIONS:  New Prescriptions    No medications on file            FINAL DISPOSITION     Final diagnoses:   Closed head injury, initial encounter   Fall, initial encounter   Dehydration   Hyponatremia   Elevated troponin     Condition: condition: good  Dispo: Admit to telemetry    PATIENT REFERRED TO:  No follow-up provider specified. Critical Care Time   None    This transcription was electronically signed. Parts of this transcriptions may have been dictated by use of voice recognition software and electronically transcribed, and parts may have been transcribed with the assistance of an ED scribe.  The transcription may contain errors not detected in proofreading.     Electronically Signed: Lila Richards DO, 08/07/21, 7:20 PM      Lila Richards DO  08/07/21 1920

## 2021-08-07 NOTE — ED NOTES
ED to inpatient nurses report    Chief Complaint   Patient presents with    Fall      Present to ED from home  LOC: alert and orientated to name, place, date  Vital signs   Vitals:    08/07/21 1454 08/07/21 1506 08/07/21 1752 08/07/21 1834   BP: (!) 191/68 (!) 172/69 (!) 179/72 (!) 187/89   Pulse: 68  72    Resp: 17      Temp: 97.8 °F (36.6 °C)      TempSrc: Oral      SpO2: 96%      Weight:   150 lb (68 kg)    Height:   5' 3\" (1.6 m)       Oxygen Baseline RA    Current needs required none Bipap/Cpap No  LDAs:   Peripheral IV Left Antecubital (Active)       Peripheral IV 08/07/21 Right Forearm (Active)     Mobility: up with one assist  Pending ED orders: none  Present condition: stable    Our promise was given to patient    Electronically signed by Amanda Nieto RN on 8/7/2021 at 6:37 PM       Amanda Nieto RN  08/07/21 2533

## 2021-08-07 NOTE — ED TRIAGE NOTES
Pt to ED w/rprts of Fall hitting head against door jam in bedroom. No LOC. Denies blood thinners. EMS rprts family rprtdng \"confusion\" following incident, but back to baseline now. Pt alert and oriented to self. Breathing easy and unlabored on RA. Pt placed on monitor. Vitals updated; BP elevated.

## 2021-08-08 LAB
ANION GAP SERPL CALCULATED.3IONS-SCNC: 12 MEQ/L (ref 8–16)
BUN BLDV-MCNC: 7 MG/DL (ref 7–22)
CALCIUM SERPL-MCNC: 8.9 MG/DL (ref 8.5–10.5)
CHLORIDE BLD-SCNC: 99 MEQ/L (ref 98–111)
CO2: 25 MEQ/L (ref 23–33)
CREAT SERPL-MCNC: 0.6 MG/DL (ref 0.4–1.2)
EKG ATRIAL RATE: 76 BPM
EKG P AXIS: 5 DEGREES
EKG P-R INTERVAL: 172 MS
EKG Q-T INTERVAL: 386 MS
EKG QRS DURATION: 82 MS
EKG QTC CALCULATION (BAZETT): 434 MS
EKG R AXIS: -5 DEGREES
EKG T AXIS: 30 DEGREES
EKG VENTRICULAR RATE: 76 BPM
ERYTHROCYTE [DISTWIDTH] IN BLOOD BY AUTOMATED COUNT: 14.2 % (ref 11.5–14.5)
ERYTHROCYTE [DISTWIDTH] IN BLOOD BY AUTOMATED COUNT: 45.9 FL (ref 35–45)
GFR SERPL CREATININE-BSD FRML MDRD: > 90 ML/MIN/1.73M2
GLUCOSE BLD-MCNC: 101 MG/DL (ref 70–108)
HCT VFR BLD CALC: 38.3 % (ref 37–47)
HEMOGLOBIN: 12.3 GM/DL (ref 12–16)
MCH RBC QN AUTO: 28.7 PG (ref 26–33)
MCHC RBC AUTO-ENTMCNC: 32.1 GM/DL (ref 32.2–35.5)
MCV RBC AUTO: 89.5 FL (ref 81–99)
OSMOLALITY CALCULATION: 270.1 MOSMOL/KG (ref 275–300)
OSMOLALITY: 282 MOSMOL/KG (ref 275–295)
PLATELET # BLD: 295 THOU/MM3 (ref 130–400)
PMV BLD AUTO: 10.8 FL (ref 9.4–12.4)
POTASSIUM SERPL-SCNC: 3.8 MEQ/L (ref 3.5–5.2)
RBC # BLD: 4.28 MILL/MM3 (ref 4.2–5.4)
SODIUM BLD-SCNC: 134 MEQ/L (ref 135–145)
SODIUM BLD-SCNC: 136 MEQ/L (ref 135–145)
TROPONIN T: 0.05 NG/ML
TROPONIN T: 0.07 NG/ML
WBC # BLD: 10.1 THOU/MM3 (ref 4.8–10.8)

## 2021-08-08 PROCEDURE — 6360000002 HC RX W HCPCS: Performed by: STUDENT IN AN ORGANIZED HEALTH CARE EDUCATION/TRAINING PROGRAM

## 2021-08-08 PROCEDURE — 6370000000 HC RX 637 (ALT 250 FOR IP): Performed by: STUDENT IN AN ORGANIZED HEALTH CARE EDUCATION/TRAINING PROGRAM

## 2021-08-08 PROCEDURE — 6360000002 HC RX W HCPCS: Performed by: PHYSICIAN ASSISTANT

## 2021-08-08 PROCEDURE — 6370000000 HC RX 637 (ALT 250 FOR IP)

## 2021-08-08 PROCEDURE — 80048 BASIC METABOLIC PNL TOTAL CA: CPT

## 2021-08-08 PROCEDURE — 2060000000 HC ICU INTERMEDIATE R&B

## 2021-08-08 PROCEDURE — 84295 ASSAY OF SERUM SODIUM: CPT

## 2021-08-08 PROCEDURE — 36415 COLL VENOUS BLD VENIPUNCTURE: CPT

## 2021-08-08 PROCEDURE — 2580000003 HC RX 258: Performed by: STUDENT IN AN ORGANIZED HEALTH CARE EDUCATION/TRAINING PROGRAM

## 2021-08-08 PROCEDURE — 83930 ASSAY OF BLOOD OSMOLALITY: CPT

## 2021-08-08 PROCEDURE — 85027 COMPLETE CBC AUTOMATED: CPT

## 2021-08-08 PROCEDURE — 84484 ASSAY OF TROPONIN QUANT: CPT

## 2021-08-08 PROCEDURE — 99233 SBSQ HOSP IP/OBS HIGH 50: CPT | Performed by: INTERNAL MEDICINE

## 2021-08-08 RX ORDER — HYDRALAZINE HYDROCHLORIDE 25 MG/1
25 TABLET, FILM COATED ORAL EVERY 8 HOURS SCHEDULED
Status: DISCONTINUED | OUTPATIENT
Start: 2021-08-08 | End: 2021-08-09 | Stop reason: HOSPADM

## 2021-08-08 RX ORDER — HALOPERIDOL 5 MG/ML
1 INJECTION INTRAMUSCULAR
Status: COMPLETED | OUTPATIENT
Start: 2021-08-08 | End: 2021-08-08

## 2021-08-08 RX ORDER — PANTOPRAZOLE SODIUM 40 MG/1
TABLET, DELAYED RELEASE ORAL
Status: COMPLETED
Start: 2021-08-08 | End: 2021-08-08

## 2021-08-08 RX ORDER — LORAZEPAM 2 MG/ML
0.5 INJECTION INTRAMUSCULAR EVERY 6 HOURS PRN
Status: DISCONTINUED | OUTPATIENT
Start: 2021-08-08 | End: 2021-08-09 | Stop reason: HOSPADM

## 2021-08-08 RX ORDER — AMLODIPINE BESYLATE 10 MG/1
10 TABLET ORAL DAILY
Status: DISCONTINUED | OUTPATIENT
Start: 2021-08-09 | End: 2021-08-09 | Stop reason: HOSPADM

## 2021-08-08 RX ORDER — LANOLIN ALCOHOL/MO/W.PET/CERES
3 CREAM (GRAM) TOPICAL NIGHTLY PRN
Status: DISCONTINUED | OUTPATIENT
Start: 2021-08-08 | End: 2021-08-09 | Stop reason: HOSPADM

## 2021-08-08 RX ADMIN — HALOPERIDOL LACTATE 1 MG: 5 INJECTION, SOLUTION INTRAMUSCULAR at 19:35

## 2021-08-08 RX ADMIN — Medication 3 MG: at 19:44

## 2021-08-08 RX ADMIN — HYDRALAZINE HYDROCHLORIDE 25 MG: 25 TABLET, FILM COATED ORAL at 15:26

## 2021-08-08 RX ADMIN — METOPROLOL SUCCINATE 50 MG: 50 TABLET, FILM COATED, EXTENDED RELEASE ORAL at 10:38

## 2021-08-08 RX ADMIN — Medication 3 MG: at 02:09

## 2021-08-08 RX ADMIN — HYDRALAZINE HYDROCHLORIDE 25 MG: 25 TABLET, FILM COATED ORAL at 04:21

## 2021-08-08 RX ADMIN — ACETAMINOPHEN 650 MG: 325 TABLET ORAL at 10:39

## 2021-08-08 RX ADMIN — ACETAMINOPHEN 650 MG: 325 TABLET ORAL at 04:20

## 2021-08-08 RX ADMIN — PANTOPRAZOLE SODIUM 40 MG: 40 TABLET, DELAYED RELEASE ORAL at 04:23

## 2021-08-08 RX ADMIN — ACETAMINOPHEN 650 MG: 325 TABLET ORAL at 19:44

## 2021-08-08 RX ADMIN — SODIUM CHLORIDE: 9 INJECTION, SOLUTION INTRAVENOUS at 05:13

## 2021-08-08 RX ADMIN — ONDANSETRON 4 MG: 2 INJECTION INTRAMUSCULAR; INTRAVENOUS at 04:20

## 2021-08-08 RX ADMIN — AMLODIPINE BESYLATE 5 MG: 5 TABLET ORAL at 10:38

## 2021-08-08 ASSESSMENT — PAIN DESCRIPTION - DESCRIPTORS: DESCRIPTORS: ACHING;HEADACHE

## 2021-08-08 ASSESSMENT — PAIN SCALES - GENERAL
PAINLEVEL_OUTOF10: 0
PAINLEVEL_OUTOF10: 3
PAINLEVEL_OUTOF10: 3

## 2021-08-08 ASSESSMENT — PAIN DESCRIPTION - ORIENTATION: ORIENTATION: ANTERIOR

## 2021-08-08 ASSESSMENT — PAIN DESCRIPTION - PROGRESSION: CLINICAL_PROGRESSION: NOT CHANGED

## 2021-08-08 ASSESSMENT — PAIN DESCRIPTION - LOCATION: LOCATION: HEAD

## 2021-08-08 ASSESSMENT — PAIN DESCRIPTION - ONSET: ONSET: ON-GOING

## 2021-08-08 ASSESSMENT — PAIN DESCRIPTION - PAIN TYPE: TYPE: ACUTE PAIN

## 2021-08-08 ASSESSMENT — PAIN DESCRIPTION - FREQUENCY: FREQUENCY: CONTINUOUS

## 2021-08-08 ASSESSMENT — PAIN - FUNCTIONAL ASSESSMENT: PAIN_FUNCTIONAL_ASSESSMENT: ACTIVITIES ARE NOT PREVENTED

## 2021-08-08 NOTE — PROGRESS NOTES
Patient refusing IV restart. Crosses arm and states \"I don't want anymore needles. Not today\". Discussed with primary RN.

## 2021-08-08 NOTE — FLOWSHEET NOTE
08/07/21 2008   Provider Notification   Reason for Communication Review case  (elevated blood pressure)   Provider Name Effie Sicard   Provider Notification Resident   Method of Communication Secure Message   Response See orders   Notification Time 2008     Clarified Cardene drip order with Dr. Effie Sicard. Patient was never given oral medications. Spoke with Dr. Effie Sicard about attempting to give oral antihypertensive medications before starting the drip. See orders for 50 mg Toprol XL and 5 mg of Norvasc to be given.

## 2021-08-08 NOTE — PROGRESS NOTES
Report called to [facility]. Patient transferred to  [phone]. All belongings and meds transferred with patient. Update called to daughter, Fransisca Villanueva.

## 2021-08-08 NOTE — PROGRESS NOTES
Hospitalist Progress Note    Patient: Redgie Denver      Unit/Bed:4A-07/007-A    YOB: 1928    MRN: [id number]       Acct: [de-identified]     PCP: Floyd Schaefer. SHAMA Quiroz - CNP    Date of Admission: 8/7/2021    Assessment/Plan:    1. Hypertensive emergency -resolving. With end organ damage on presentation as evident by increasing troponin level, confusion. History of hypertension. Blood pressure elevated with SBP > 180 with signs of confusion. No change in vision, no headache. No acute findings on CT head scan. 8/8/21 overnight blood pressure was elevated to SBP over 200. Cardene drip was started for a brief period of time. Blood pressure was dropped to 148/62 at which time Cardene drip was stopped. Patient was started on hydralazine 25 mg every 8 hours. Amlodipine was increased to 10 mg. Toprol was increased to 50 mg. Blood pressure has been stable throughout the day. She denies headache, blurry vision, chest pain. Continue to monitor blood pressure closely     2. Elevated Troponin - 0.075 on presentation. Unable to assess for chest pain/shortness of breath, patient is a poor historian. Troponin from 8/3 normal. Initial EKG reveals sinus tachycardia. The Elevated troponin could be 2/2 to hypertension. 8/8/21 Troponin level 0.075>0.049. EKG does not reveal any ischemic changes. Patient denies chest pain or shortness of breath     3. Witnessed mechanical fall - patient fell forward, striking her head on a door frame. No loss of consciousness. Not on any blood thinners. CT head does not reveal any acute intracranial bleed. Suspecting 2/2 to dehydration. Neuro checks q4h. Check orthostatics.      3. Altered mental status - improving. unknown etiology but suspecting 2/2 to dehydration/hypertension. Urine culture->negative. CXR negative for pneumonia/effusion. Continue to hydrate with IV normal saline at 125 cc/hr.          Expected discharge date: TBD    Disposition:    [x] Home       [] TCU       [] Rehab       [] Psych       [] SNF       [] [place]       [] Other-    Chief Complaint: Confusion    Hospital Course:      80 y.o. female who presented to [address] [place] [address] for evaluation s/p witnessed fall and confusion. PMHx includes Hypertension. Daughter at bedside provides history, patient is confused and hard of hearing. Daughter states that patient fell forward and struck her head on the door frame. Did not loose consciousness, no blood thinners. Patient has been more confused for the past couple of days. She presented to [facility] ED on 8/3/21 for evaluation of weakness and c/o of upper respiratory cough and congestion. At the time CXR did not reveals any acute findings. She was found to have URI 2/2 to possible viral etiology.      ED course: Given 1L normal saline. Lab significant for Sodium level 131, serum osmolality 263, Troponin elevated at 0.075. CXR does not reveal acute abnormality. CT head->negative for any acute abnormalities. CT cervical spine->negative for fracture. Urine culture->negative. 8/8/21 overnight blood pressure was elevated to SBP over 200. Cardene drip was started for a brief period of time. Blood pressure was dropped to 148/62 at which time Cardene drip was stopped. Patient was started on hydralazine 25 mg every 8 hours. Amlodipine was increased to 10 mg. Toprol was increased to 50 mg. Blood pressure has been stable throughout the day. She denies headache, blurry vision, chest pain. Subjective (past 24 hours):     Patient confused this morning but able to answer questions. Denies chest pain or shortness of breath. Overnight systolic blood pressure was found to be over 200. She was started on nicardipine drip for a brief period of time. Blood pressure stabilized. ROS (12 point review of systems completed. Pertinent positives noted. Otherwise ROS is negative).     Medications:  Reviewed    Infusion Medications    sodium chloride      sodium chloride 125 mL/hr at 08/08/21 0513     Scheduled Medications    hydrALAZINE  25 mg Oral 3 times per day    [START ON 8/9/2021] amLODIPine  10 mg Oral Daily    sodium chloride flush  5-40 mL Intravenous 2 times per day    enoxaparin  40 mg Subcutaneous Q24H    pantoprazole  40 mg Oral QAM AC    metoprolol succinate  50 mg Oral Daily     PRN Meds: LORazepam, melatonin, sodium chloride flush, sodium chloride, ondansetron **OR** ondansetron, polyethylene glycol, acetaminophen **OR** acetaminophen      Intake/Output Summary (Last 24 hours) at 8/8/2021 1739  Last data filed at 8/8/2021 0309  Gross per 24 hour   Intake 1121.61 ml   Output 400 ml   Net 721.61 ml       Diet:  ADULT DIET; Regular    Exam:  BP (!) 154/77   Pulse 76   Temp 98.1 °F (36.7 °C)   Resp 18   Ht 5' 3\" (1.6 m)   Wt 146 lb 8 oz (66.5 kg)   SpO2 97%   BMI 25.95 kg/m²     General appearance: No apparent distress, appears stated age and cooperative. HEENT: Pupils equal, round, and reactive to light. Conjunctivae/corneas clear. Neck: Supple, with full range of motion. No jugular venous distention. Trachea midline. Respiratory:  Normal respiratory effort. Clear to auscultation, bilaterally without Rales/Wheezes/Rhonchi. Cardiovascular: Regular rate and rhythm with normal S1/S2 without murmurs, rubs or gallops. Abdomen: Soft, non-tender, non-distended with normal bowel sounds. Musculoskeletal: passive and active ROM x 4 extremities. Skin: Skin color, texture, turgor normal.  No rashes or lesions. Neurologic:  Neurovascularly intact without any focal sensory/motor deficits. Cranial nerves: II-XII intact, grossly non-focal.  Psychiatric: Alert to self but not to place or time.   Capillary Refill: Brisk,< 3 seconds   Peripheral Pulses: +2 palpable, equal bilaterally       Labs:   Recent Labs     08/07/21  1505 08/08/21  0653   WBC 8.1 10.1   HGB 11.7* 12.3   HCT 36.0* 38.3    295     Recent Labs 08/07/21  1505 08/08/21  0011 08/08/21  0653   * 134* 136   K 4.2  --  3.8   CL 96*  --  99   CO2 22*  --  25   BUN 12  --  7   CREATININE 0.7  --  0.6   CALCIUM 8.9  --  8.9     No results for input(s): AST, ALT, BILIDIR, BILITOT, ALKPHOS in the last 72 hours. Recent Labs     08/07/21  1505   INR 1.05     No results for input(s): Les Christopher in the last 72 hours. Microbiology:      Urinalysis:      Lab Results   Component Value Date    NITRU NEGATIVE 08/07/2021    WBCUA NONE SEEN 08/03/2021    BACTERIA NONE SEEN 08/03/2021    RBCUA 0-2 08/03/2021    BLOODU NEGATIVE 08/07/2021    SPECGRAV 1.011 08/03/2021    GLUCOSEU NEGATIVE 08/07/2021       Radiology:  CT Head WO Contrast   Final Result      1. No acute intracranial hemorrhage, mass effect or midline shift. 2. Chronic senescent changes of the brain including generalized atrophy and chronic microvascular ischemic changes in the white matter. **This report has been created using voice recognition software. It may contain minor errors which are inherent in voice recognition technology. **      Final report electronically signed by Dr Star Haddad on 8/7/2021 3:53 PM      CT Cervical Spine WO Contrast   Final Result   Degenerative changes with no acute fracture. **This report has been created using voice recognition software. It may contain minor errors which are inherent in voice recognition technology. **      Final report electronically signed by Dr Star Haddad on 8/7/2021 3:56 PM      XR CHEST PORTABLE   Final Result   There is no acute intrathoracic process. **This report has been created using voice recognition software. It may contain minor errors which are inherent in voice recognition technology. **      Final report electronically signed by Dr Star Haddad on 8/7/2021 3:42 PM          DVT prophylaxis: [x] Lovenox                                 [] SCDs                                 [] SQ Heparin                                 [] Encourage ambulation           [] Already on Anticoagulation     Code Status: Full Code    PT/OT Eval Status: YES    Tele:   [x] yes             [] no    Electronically signed by Elizabeth Leon MD on 8/8/2021 at 5:39 PM

## 2021-08-08 NOTE — PLAN OF CARE
Problem: Falls - Risk of:  Goal: Will remain free from falls  Description: Will remain free from falls  Outcome: Ongoing  Note: Call light in reach, bed in lowest position, and bed alarm activated. Education given on use of call light before ambulation and when in need of assistance. Patient expressed understanding. Hourly visual checks performed and charted. Toileting offered to patient. No falls this shift, at any time. Will continue to monitor. Problem: Pain:  Goal: Pain level will decrease  Description: Pain level will decrease  Outcome: Ongoing  Note: Patient able to use 0-10 pain scale, pain goal of no pain. Denies pain at this time. Agreeable to take PRN pain medications. Problem: Skin Integrity:  Goal: Will show no infection signs and symptoms  Description: Will show no infection signs and symptoms  Outcome: Ongoing  Note: No signs of new skin breakdown with each assessment. Skin remains warm, dry, intact. Mucous membranes pink & moist. Patient is able to turn self. Problem: Discharge Planning:  Goal: Discharged to appropriate level of care  Description: Discharged to appropriate level of care  Outcome: Ongoing  Note: Discharge planning in process and discussed with patient/family. Social work consulted for any additional needs. Care manager aware of discharge needs. Problem: Cardiac:  Goal: Ability to maintain vital signs within normal range will improve  Description: Ability to maintain vital signs within normal range will improve  Outcome: Ongoing  Note:   Vitals:    08/07/21 2140 08/07/21 2300 08/07/21 2315 08/07/21 2330   BP: (!) 208/80 (!) 190/71 (!) 148/62 137/79   Pulse:  90 97 84   Resp:  20     Temp:  98.6 °F (37 °C)     TempSrc:  Oral     SpO2:  97%     Weight:       Height:            Care plan reviewed with patient. Patient verbalizes understanding of the plan of care and contributed to goal setting.

## 2021-08-09 VITALS
OXYGEN SATURATION: 95 % | TEMPERATURE: 97.9 F | RESPIRATION RATE: 18 BRPM | WEIGHT: 143.2 LBS | BODY MASS INDEX: 25.37 KG/M2 | HEART RATE: 79 BPM | HEIGHT: 63 IN | DIASTOLIC BLOOD PRESSURE: 68 MMHG | SYSTOLIC BLOOD PRESSURE: 150 MMHG

## 2021-08-09 LAB
FOLATE: 15.8 NG/ML (ref 4.8–24.2)
LV EF: 60 %
LVEF MODALITY: NORMAL
VITAMIN B-12: 1212 PG/ML (ref 211–911)

## 2021-08-09 PROCEDURE — 82607 VITAMIN B-12: CPT

## 2021-08-09 PROCEDURE — 6370000000 HC RX 637 (ALT 250 FOR IP): Performed by: STUDENT IN AN ORGANIZED HEALTH CARE EDUCATION/TRAINING PROGRAM

## 2021-08-09 PROCEDURE — 36415 COLL VENOUS BLD VENIPUNCTURE: CPT

## 2021-08-09 PROCEDURE — 82746 ASSAY OF FOLIC ACID SERUM: CPT

## 2021-08-09 PROCEDURE — 93306 TTE W/DOPPLER COMPLETE: CPT

## 2021-08-09 PROCEDURE — 99239 HOSP IP/OBS DSCHRG MGMT >30: CPT | Performed by: INTERNAL MEDICINE

## 2021-08-09 RX ORDER — METOPROLOL SUCCINATE 50 MG/1
50 TABLET, EXTENDED RELEASE ORAL DAILY
Qty: 30 TABLET | Refills: 3 | Status: ON HOLD | OUTPATIENT
Start: 2021-08-10 | End: 2022-01-01 | Stop reason: HOSPADM

## 2021-08-09 RX ORDER — HYDRALAZINE HYDROCHLORIDE 25 MG/1
25 TABLET, FILM COATED ORAL EVERY 8 HOURS SCHEDULED
Qty: 90 TABLET | Refills: 3 | Status: ON HOLD | OUTPATIENT
Start: 2021-08-09 | End: 2022-01-01

## 2021-08-09 RX ORDER — AMLODIPINE BESYLATE 10 MG/1
10 TABLET ORAL DAILY
Qty: 30 TABLET | Refills: 3 | Status: ON HOLD | OUTPATIENT
Start: 2021-08-10 | End: 2022-01-01 | Stop reason: HOSPADM

## 2021-08-09 RX ADMIN — PANTOPRAZOLE SODIUM 40 MG: 40 TABLET, DELAYED RELEASE ORAL at 03:58

## 2021-08-09 RX ADMIN — AMLODIPINE BESYLATE 10 MG: 10 TABLET ORAL at 09:37

## 2021-08-09 RX ADMIN — METOPROLOL SUCCINATE 50 MG: 50 TABLET, FILM COATED, EXTENDED RELEASE ORAL at 09:37

## 2021-08-09 RX ADMIN — HYDRALAZINE HYDROCHLORIDE 25 MG: 25 TABLET, FILM COATED ORAL at 03:56

## 2021-08-09 RX ADMIN — HYDRALAZINE HYDROCHLORIDE 25 MG: 25 TABLET, FILM COATED ORAL at 17:59

## 2021-08-09 ASSESSMENT — PAIN SCALES - GENERAL
PAINLEVEL_OUTOF10: 0

## 2021-08-09 NOTE — DISCHARGE SUMMARY
Hospital Medicine Discharge Summary      Patient Identification:   Gabriella Loaiza   : 1928  MRN: [id number]   Account: [de-identified]      Patient's PCP: SHAMA Cooper - CNP    Admit Date: 2021     Discharge Date:   21    Admitting Physician: Steve Manriquez MD     Discharge Physician: Kevon Ivey DO     Discharge Diagnoses:    1. Hypertensive emergency -Resolved. With end organ damage on presentation as evident by increasing troponin level, confusion. History of hypertension. Blood pressure elevated with SBP > 180 with signs of confusion. No change in vision, no headache, and no acute findings on CT head scan.     : Overnight BP was elevated to SBP over 200. Cardene drip was started, blood pressure was dropped to 148/62, Cardene drip was then stopped. Patient was started on hydralazine 25 mg every 8 hours. Amlodipine was increased to 10 mg. Toprol was increased to 50 mg. Blood pressure has been stable throughout the day. She denies headache, blurry vision, chest pain. Continue to monitor blood pressure closely. : Patient on Amlodipine 10 mg, Hydralazine 25 mg, and Toprol XL 50 mg.      2. Elevated Troponin - 0.075 on presentation. Unable to assess for chest pain/shortness of breath, patient is a poor historian. Troponin from 8/3 normal. Initial EKG reveals sinus tachycardia. The Elevated troponin could be 2/2 to hypertension. : Troponin level 0.049. EKG does not reveal any ischemic changes. Patient denies chest pain or shortness of breath.     3. Witnessed mechanical fall - Patient fell forward and hit her head on a door frame. No loss of consciousness.  Not on any blood thinners. CT head does not reveal any acute intracranial bleed. Suspecting 2/2 to dehydration. Neuro checks q4h. Check orthostatics.      3. Altered mental status - Improving. unknown etiology but suspecting 2/2 to dehydration/hypertension. Urine culture was negative.  CXR negative for pneumonia/effusion. Continue to hydrate with IV normal saline at 125 cc/hr. 8/9: B12 level 1212, folate level 15.8 today. The patient was seen and examined on day of discharge and this discharge summary is in conjunction with any daily progress note from day of discharge. Hospital Course: Bethany Britton is a 80 y.o. female with a past medical history of hypertension was admitted to [address] [place] [address] on 8/7/2021 for evaluation s/p witnessed fall and confusion. Patient is confused and hard of hearing. Her daughter lives with her and states that patient fell forward and struck her head on the door frame. She did not loose consciousness and is not on blood thinners. Patient has been more confused for the past couple of days. She presented to [facility] ED on 8/3/21 for evaluation of weakness and c/o of upper respiratory cough and congestion. At the time CXR did not reveals any acute findings. She was found to have URI 2/2 to possible viral etiology.      ED course: Given 1L normal saline. Lab significant for Sodium level 131, serum osmolality 263, Troponin elevated at 0.075. CXR does not reveal acute abnormality. CT head was negative for any acute abnormalities. CT cervical spine was negative for fracture. Urine culture was negative.      8/8: Overnight blood pressure was elevated to SBP over 200. Cardene drip was started for a brief period of time. Blood pressure was dropped to 148/62 at which time Cardene drip was stopped. Patient was started on hydralazine 25 mg every 8 hours. Amlodipine was increased to 10 mg. Toprol was increased to 50 mg. Blood pressure has been stable throughout the day. She denies headache, blurry vision, chest pain. 8/9:  Patient is still confused and believes she is on a ship. Her bps are stable and she is on Amlodipine 10 mg, Hydralazine 25 mg, and Toprol XL 50 mg. Echo results pending. B12 level 1212, folate level 15.8 today.  Follow with PCP for echo results and B12 level. Exam:     Vitals:  Vitals:    08/09/21 0353 08/09/21 0928 08/09/21 1319 08/09/21 1752   BP: (!) 149/73 (!) 162/70 (!) 148/78 (!) 150/68   Pulse: 73 80 82 79   Resp: 18 16 16 18   Temp: 97.4 °F (36.3 °C) 98 °F (36.7 °C) 97.8 °F (36.6 °C) 97.9 °F (36.6 °C)   TempSrc: Axillary Oral Oral Oral   SpO2: 95% 95% 96% 95%   Weight: 143 lb 3.2 oz (65 kg)      Height:         Weight: Weight: 143 lb 3.2 oz (65 kg)     24 hour intake/output:    Intake/Output Summary (Last 24 hours) at 8/9/2021 1810  Last data filed at 8/9/2021 1750  Gross per 24 hour   Intake 0 ml   Output --   Net 0 ml         General appearance:  No apparent distress, appears stated age and cooperative. HEENT:  Normal cephalic, atraumatic without obvious deformity. Pupils equal, round, and reactive to light. Extra ocular muscles intact. Conjunctivae/corneas clear. Neck: Supple, with full range of motion. No jugular venous distention. Trachea midline. Respiratory:  Normal respiratory effort. Clear to auscultation, bilaterally without Rales/Wheezes/Rhonchi. Cardiovascular:  Regular rate and rhythm with normal S1/S2 without murmurs, rubs or gallops. Abdomen: Soft, non-tender, non-distended with normal bowel sounds. Musculoskeletal:  No clubbing, cyanosis or edema bilaterally. Full range of motion without deformity. Skin: Skin color, texture, turgor normal.  No rashes or lesions. Neurologic:  Not oriented to time or place. Oriented to self. No obvious focal neurological deficits. Psychiatric:  Alert and oriented, thought content appropriate, normal insight  Capillary Refill: Brisk,< 3 seconds   Peripheral Pulses: +2 palpable, equal bilaterally       Labs:  For convenience and continuity at follow-up the following most recent labs are provided:      CBC:    Lab Results   Component Value Date    WBC 10.1 08/08/2021    HGB 12.3 08/08/2021    HCT 38.3 08/08/2021     08/08/2021       Renal:    Lab Results   Component Value Date     08/08/2021    K 3.8 08/08/2021    K 3.8 08/03/2021    CL 99 08/08/2021    CO2 25 08/08/2021    BUN 7 08/08/2021    CREATININE 0.6 08/08/2021    CALCIUM 8.9 08/08/2021         Significant Diagnostic Studies    Radiology:   CT Head WO Contrast   Final Result      1. No acute intracranial hemorrhage, mass effect or midline shift. 2. Chronic senescent changes of the brain including generalized atrophy and chronic microvascular ischemic changes in the white matter. **This report has been created using voice recognition software. It may contain minor errors which are inherent in voice recognition technology. **      Final report electronically signed by Dr Mark Sam on 8/7/2021 3:53 PM      CT Cervical Spine WO Contrast   Final Result   Degenerative changes with no acute fracture. **This report has been created using voice recognition software. It may contain minor errors which are inherent in voice recognition technology. **      Final report electronically signed by Dr Mark Sam on 8/7/2021 3:56 PM      XR CHEST PORTABLE   Final Result   There is no acute intrathoracic process. **This report has been created using voice recognition software. It may contain minor errors which are inherent in voice recognition technology. **      Final report electronically signed by Dr Mark Sam on 8/7/2021 3:42 PM             Consults:     PALLIATIVE CARE EVAL  IP CONSULT TO HOME CARE NEEDS    Disposition: Home  Condition at Discharge: Stable    Code Status:  Full Code     Patient Instructions:    Discharge lab work: None  Activity: activity as tolerated  Diet: ADULT DIET; Regular      Follow-up visits:   No follow-up provider specified.        Discharge Medications:      Feliciano Cranker   Home Medication Instructions BZN:[id number]    Printed on:08/09/21 1810   Medication Information                      amLODIPine (NORVASC) 10 MG tablet  Take 1 tablet by mouth daily             hydrALAZINE (APRESOLINE) 25 MG tablet  Take 1 tablet by mouth every 8 hours             metoprolol succinate (TOPROL XL) 50 MG extended release tablet  Take 1 tablet by mouth daily                 Time Spent on discharge is more than 30 minutes in the examination, evaluation, counseling and review of medications and discharge plan. Signed: Thank you SHAMA Raines CNP for the opportunity to be involved in this patient's care.     Electronically signed by Nohelia Smith DO on 8/9/2021 at 6:10 PM

## 2021-08-09 NOTE — CARE COORDINATION
8/9/21, 7:35 AM EDT  DISCHARGE PLANNING EVALUATION:    Slime Moreno       Admitted: 8/7/2021/ [address] day: 2   Location: [facility]07/007-A Reason for admit: Dehydration [E86.0]  Confusion [R41.0]  Hyponatremia [E87.1]  Elevated troponin [R77.8]  Closed head injury, initial encounter [P55.23RF]  Fall, initial encounter [W19. XXXA]   PMH:  has a past medical history of Hypertension. Procedure: none  Barriers to Discharge:  Presented to ED post fall, daughter witnessed and [place] also had slurred speech. No acute findings CT head and spine. Hospitalist following. Orthostatics. Fall precautions. Telemetry. Neuro checks. Palliative care eval. + troponin. IVF. PCP: SHAMA Hernandez - CNP  Readmission Risk Score: 13%    Patient Goals/Plan/Treatment Preferences: Spoke with patient and daughter. Plan is to return home at discharge. Daughter would like  as mentation has worsened. RN, PT, OT requested, does not feel an aide is needed. Provided with [facility] list, daughter will notify RN once she chooses agency. CM to arrange once agency chosen, hospitalist updated and will place order. Transportation/Food Security/Housekeeping Addressed:  No issues identified.

## 2021-08-09 NOTE — PLAN OF CARE
Problem: Falls - Risk of:  Goal: Will remain free from falls  Description: Will remain free from falls  Outcome: Ongoing  Note: Call light in reach, bed in lowest position, and bed alarm activated. Education given on use of call light before ambulation and when in need of assistance. Patient expressed understanding. Hourly visual checks performed and charted. Toileting offered to patient. No falls this shift, at any time. Will continue to monitor. Problem: Pain:  Goal: Pain level will decrease  Description: Pain level will decrease  Outcome: Ongoing  Note: Patient able to use 0-10 pain scale, pain goal of no pain. Denies pain at this time. Agreeable to take PRN pain medications. Problem: Skin Integrity:  Goal: Will show no infection signs and symptoms  Description: Will show no infection signs and symptoms  Outcome: Ongoing  Note: No signs of new skin breakdown with each assessment. Skin remains warm, dry, intact. Mucous membranes pink & moist. Patient is able to turn self. Problem: Discharge Planning:  Goal: Discharged to appropriate level of care  Description: Discharged to appropriate level of care  Outcome: Ongoing  Note: Discharge planning in process and discussed with patient/family. Social work consulted for any additional needs. Care manager aware of discharge needs. Problem: Cardiac:  Goal: Ability to maintain vital signs within normal range will improve  Description: Ability to maintain vital signs within normal range will improve  Outcome: Ongoing  Note:   Vitals:    08/08/21 1026 08/08/21 1133 08/08/21 1521 08/08/21 1944   BP: (!) 160/84 (!) 154/77     Pulse: 76 76  74   Resp: 18 16 18 16   Temp: 97.9 °F (36.6 °C) 98.1 °F (36.7 °C)     TempSrc: Oral      SpO2: 96% 97%     Weight:       Height:            Patient is unable to participate in care planning and goal setting. Family has been updated and voices understanding.

## 2021-08-09 NOTE — FLOWSHEET NOTE
[facility] 88 PROGRESS NOTE      Patient: Weston Malik  Room #: 4A-07/007-A            YOB: 1928  Age: 80 y.o. Gender: female            Admit Date & Time: 8/7/2021  2:36 PM    Assessment:   rounding;  Patient from this room made eye contact with  ; Chaplaoin entered for brief visit. Daughter in room reported patient to be confused. Patient did exhibit some confusion, but seemed inclined to interact. Patient seemed fairly ready to interact. Interventions:   offered to pray which was accepted. Prayer was accepted. Patient in particular seemed to enjoy prayer;  Daughter also appreciative of prayer. Outcomes:  Patient seemed to enjoy visit and especailly prayer. Plan:    1. Visit and support with listening and prayer. Electronically signed by Thang Villalpando on 8/9/2021 at 3:07 PM.  [facility]  [phone]             08/09/21 1425   Encounter Summary   Services provided to: Patient and family together   Referral/Consult From: [facility];Family members   Continue Visiting Yes  (08/09/2021  P  F )   Complexity of Encounter Moderate   Length of Encounter 15 minutes   Spiritual Assessment Completed Yes   Routine   Type Initial   Assessment Approachable; Anxious; Peaceful   Intervention Active listening;Nurtured hope;Prayer;Sustaining presence/ Ministry of presence   Outcome Acceptance; Coping;Less anxious, less agitated

## 2021-08-09 NOTE — PROGRESS NOTES
Physician Progress Note      PATIENT:               Darya French  CSN #:                  [id number]  :                       1928  ADMIT DATE:       2021 2:36 PM  [facility] DATE:  RESPONDING  PROVIDER #:        Christiano Mclaughlin MD          QUERY TEXT:    Pt admitted with hypertensive emergency. Pt noted to have AMS that is   improving. If possible, please document in the progress notes and discharge   summary if you are evaluating and / or treating any of the following: The medical record reflects the following:    Risk Factors: HTN, dehydration  Clinical Indicators: pt presents after fall with confusion. CT head negative,   BP's 191/68, 208/80, 190/71, 188/92, pt noted to have dehydration , AMS is   improving with treatment, per note suspect 2/2 to dehydration/hypertension  Treatment: Cardene gtt, IVF    Thank you! Chuy Daniels, CELIO AwanR  RN Clinical   P: [phone]  Options provided:  -- Metabolic encephalopathy  -- Hypertensive encephalopathy  -- Combination of hypertensive and metabolic encephalopathy  -- Other - I will add my own diagnosis  -- Disagree - Not applicable / Not valid  -- Disagree - Clinically unable to determine / Unknown  -- Refer to Clinical Documentation Reviewer    PROVIDER RESPONSE TEXT:    This patient has hypertensive encephalopathy.     Query created by: Emre Gonzalez on 2021 11:29 AM      Electronically signed by:  Christiano Mclaughlin MD 2021 4:05 PM

## 2021-08-09 NOTE — PROGRESS NOTES
Pharmacy Medication History Note      List of current medications patient is taking is complete. Source of information: outside fill hx. Changes made to medication list:  Medications removed (include reason, ex. therapy complete or physician discontinued):  Metoprolol succinate 25 mg PO Q D. Medications added/doses adjusted:  Metoprolol tartrate 25 mg PO Q D. Other notes (ex. Recent course of antibiotics, Coumadin dosing):  N/A.         Electronically signed by Maryuri Kelley on 8/9/2021 at 1:18 PM

## 2021-08-10 NOTE — CARE COORDINATION
8/10/21, 8:59 AM EDT    Patient goals/plan/ treatment preferences discussed by  and . Patient goals/plan/ treatment preferences reviewed with patient/ family. Patient/ family verbalize understanding of discharge plan and are in agreement with goal/plan/treatment preferences. Understanding was demonstrated using the teach back method. AVS provided by RN at time of discharge, which includes all necessary medical information pertaining to the patients current course of illness, treatment, post-discharge goals of care, and treatment preferences. Received message from primary RN on 8/9, patient discharging and family has requested [facility]. Message left on intake voicemail.  Order was placed by hospitalist.        IMM Letter  IMM Letter given to Patient/Family/Significant other/Guardian/POA/by[de-identified] admitting  IMM Letter date given[de-identified] 08/09/21  IMM Letter time given[de-identified] 4149

## 2021-08-13 ENCOUNTER — HOSPITAL ENCOUNTER (EMERGENCY)
Age: 86
Discharge: HOME | End: 2021-08-13
Attending: EMERGENCY MEDICINE
Payer: MEDICARE

## 2021-08-13 ENCOUNTER — APPOINTMENT (OUTPATIENT)
Dept: GENERAL RADIOLOGY | Age: 86
End: 2021-08-13
Payer: MEDICARE

## 2021-08-13 VITALS
OXYGEN SATURATION: 94 % | HEART RATE: 69 BPM | SYSTOLIC BLOOD PRESSURE: 150 MMHG | TEMPERATURE: 97.6 F | DIASTOLIC BLOOD PRESSURE: 71 MMHG | RESPIRATION RATE: 15 BRPM

## 2021-08-13 DIAGNOSIS — R00.2 PALPITATIONS: Primary | ICD-10-CM

## 2021-08-13 LAB
ANION GAP SERPL CALCULATED.3IONS-SCNC: 14 MEQ/L (ref 8–16)
BACTERIA: ABNORMAL /HPF
BASOPHILS # BLD: 1.1 %
BASOPHILS ABSOLUTE: 0.1 THOU/MM3 (ref 0–0.1)
BILIRUBIN URINE: NEGATIVE
BLOOD, URINE: NEGATIVE
BUN BLDV-MCNC: 8 MG/DL (ref 7–22)
CALCIUM SERPL-MCNC: 9.8 MG/DL (ref 8.5–10.5)
CASTS 2: ABNORMAL /LPF
CASTS UA: ABNORMAL /LPF
CHARACTER, URINE: ABNORMAL
CHLORIDE BLD-SCNC: 98 MEQ/L (ref 98–111)
CO2: 23 MEQ/L (ref 23–33)
COLOR: YELLOW
CREAT SERPL-MCNC: 0.7 MG/DL (ref 0.4–1.2)
CRYSTALS, UA: ABNORMAL
EKG ATRIAL RATE: 78 BPM
EKG P AXIS: -26 DEGREES
EKG P-R INTERVAL: 170 MS
EKG Q-T INTERVAL: 380 MS
EKG QRS DURATION: 80 MS
EKG QTC CALCULATION (BAZETT): 433 MS
EKG R AXIS: -7 DEGREES
EKG T AXIS: 55 DEGREES
EKG VENTRICULAR RATE: 78 BPM
EOSINOPHIL # BLD: 2.1 %
EOSINOPHILS ABSOLUTE: 0.2 THOU/MM3 (ref 0–0.4)
EPITHELIAL CELLS, UA: ABNORMAL /HPF
ERYTHROCYTE [DISTWIDTH] IN BLOOD BY AUTOMATED COUNT: 14.6 % (ref 11.5–14.5)
ERYTHROCYTE [DISTWIDTH] IN BLOOD BY AUTOMATED COUNT: 47.3 FL (ref 35–45)
GFR SERPL CREATININE-BSD FRML MDRD: 78 ML/MIN/1.73M2
GLUCOSE BLD-MCNC: 110 MG/DL (ref 70–108)
GLUCOSE URINE: NEGATIVE MG/DL
HCT VFR BLD CALC: 42.3 % (ref 37–47)
HEMOGLOBIN: 13.6 GM/DL (ref 12–16)
IMMATURE GRANS (ABS): 0.04 THOU/MM3 (ref 0–0.07)
IMMATURE GRANULOCYTES: 0.3 %
KETONES, URINE: NEGATIVE
LEUKOCYTE ESTERASE, URINE: NEGATIVE
LYMPHOCYTES # BLD: 32.5 %
LYMPHOCYTES ABSOLUTE: 3.7 THOU/MM3 (ref 1–4.8)
MCH RBC QN AUTO: 28.6 PG (ref 26–33)
MCHC RBC AUTO-ENTMCNC: 32.2 GM/DL (ref 32.2–35.5)
MCV RBC AUTO: 89.1 FL (ref 81–99)
MISCELLANEOUS 2: ABNORMAL
MONOCYTES # BLD: 9.4 %
MONOCYTES ABSOLUTE: 1.1 THOU/MM3 (ref 0.4–1.3)
NITRITE, URINE: NEGATIVE
NUCLEATED RED BLOOD CELLS: 0 /100 WBC
OSMOLALITY CALCULATION: 269.1 MOSMOL/KG (ref 275–300)
PH UA: 7.5 (ref 5–9)
PLATELET # BLD: 395 THOU/MM3 (ref 130–400)
PMV BLD AUTO: 10.4 FL (ref 9.4–12.4)
POTASSIUM REFLEX MAGNESIUM: 3.7 MEQ/L (ref 3.5–5.2)
PROTEIN UA: 30
RBC # BLD: 4.75 MILL/MM3 (ref 4.2–5.4)
RBC URINE: ABNORMAL /HPF
RENAL EPITHELIAL, UA: ABNORMAL
SEG NEUTROPHILS: 54.6 %
SEGMENTED NEUTROPHILS ABSOLUTE COUNT: 6.2 THOU/MM3 (ref 1.8–7.7)
SODIUM BLD-SCNC: 135 MEQ/L (ref 135–145)
SPECIFIC GRAVITY, URINE: 1.01 (ref 1–1.03)
TROPONIN T: < 0.01 NG/ML
TSH SERPL DL<=0.05 MIU/L-ACNC: 1.58 UIU/ML (ref 0.4–4.2)
UROBILINOGEN, URINE: 1 EU/DL (ref 0–1)
WBC # BLD: 11.4 THOU/MM3 (ref 4.8–10.8)
WBC UA: ABNORMAL /HPF
YEAST: ABNORMAL

## 2021-08-13 PROCEDURE — 84443 ASSAY THYROID STIM HORMONE: CPT

## 2021-08-13 PROCEDURE — 84484 ASSAY OF TROPONIN QUANT: CPT

## 2021-08-13 PROCEDURE — 85025 COMPLETE CBC W/AUTO DIFF WBC: CPT

## 2021-08-13 PROCEDURE — 93005 ELECTROCARDIOGRAM TRACING: CPT | Performed by: STUDENT IN AN ORGANIZED HEALTH CARE EDUCATION/TRAINING PROGRAM

## 2021-08-13 PROCEDURE — 99282 EMERGENCY DEPT VISIT SF MDM: CPT

## 2021-08-13 PROCEDURE — 71045 X-RAY EXAM CHEST 1 VIEW: CPT

## 2021-08-13 PROCEDURE — 81001 URINALYSIS AUTO W/SCOPE: CPT

## 2021-08-13 PROCEDURE — 36415 COLL VENOUS BLD VENIPUNCTURE: CPT

## 2021-08-13 PROCEDURE — 80048 BASIC METABOLIC PNL TOTAL CA: CPT

## 2021-08-13 ASSESSMENT — ENCOUNTER SYMPTOMS
RHINORRHEA: 0
SHORTNESS OF BREATH: 0
ABDOMINAL PAIN: 0
SINUS PAIN: 0
SORE THROAT: 0
VOMITING: 0
COUGH: 0
BACK PAIN: 0
EYE REDNESS: 0
DIARRHEA: 0
NAUSEA: 0

## 2021-08-13 NOTE — ACP (ADVANCE CARE PLANNING)
minutes:  23  Conversation Outcomes:  [x] ACP discussion completed  [x] Existing advance directive reviewed with patient; no changes to patient's previously recorded wishes  [] New Advance Directive completed  [x] Portable Do Not Rescitate prepared for Provider review and signature, signed by Dr. Tabatha Drake  [] POLST/POST/MOLST/MOST prepared for Provider review and signature      Follow-up plan:    [] Schedule follow-up conversation to continue planning  [] Referred individual to Provider for additional questions/concerns   [] Advised patient/agent/surrogate to review completed ACP document and update if needed with changes in condition, patient preferences or care setting    [x] This note routed to one or more involved healthcare providers          Patient in ED. She was discharged last week. Daughter at bedside. Robert Tiffany states there is a HCPOA document at home. I asked that she place it in her purse to have next encounter. I reviewed note from February. She had requested DNR at that time. A palliative care referral was placed last time, but code status conversation not held. This date, patient states again to [facility] her go\" if her heart stops. She is ok with intubation. DNR-CCA signed by Dr. Tabatha Drake. 6 copies provided to Robert Allen. Original placed in Medical Records bucket. Order placed. No follow up. [facility] to start on Sunday in home.

## 2021-08-13 NOTE — ED PROVIDER NOTES
[address] [address]          Pt Name: Zaire Woodard  MRN: [id number]  [place] 1/21/1928  Date of evaluation: 8/13/2021  Treating Resident Physician: Sintia Young MD  Supervising Physician: Dr. Oneida Prado       Chief Complaint   Patient presents with    Tachycardia     earlier today. not now      History obtained from the patient. HISTORY OF PRESENT ILLNESS    HPI  Zaire Woodard is a 80 y.o. female with past medical history of hypertension who presents to the emergency department for evaluation of palpitations that occurred yesterday morning lasting few hours at a time and occurred once more this a.m. lasting few hours as well. Patient also describes having chest pressure that is waxing waning of the past few days as well. She was admitted on 8/7/2021 for hypertensive emergency, elevated troponin, mechanical fall with negative CT head, and altered mental status. Underwent an echocardiogram on 8/7/2021 which showed an EF of 60% with no regional left ventricular wall motion abnormalities, with grade 1 diastolic dysfunction noted. She was discharged on 8/9/2021. She denies any shortness of breath, cough, fever, chills. Daughter at the bedside states patient has been increasingly confused since her disposition back home from the hospital.    The patient has no other acute complaints at this time. REVIEW OF SYSTEMS   Review of Systems   Constitutional: Negative for chills and fever. HENT: Negative for rhinorrhea, sinus pain and sore throat. Eyes: Negative for redness. Respiratory: Negative for cough and shortness of breath. Cardiovascular: Positive for chest pain and palpitations. Negative for leg swelling. Gastrointestinal: Negative for abdominal pain, diarrhea, nausea and vomiting. Genitourinary: Negative for dysuria and hematuria. Musculoskeletal: Negative for back pain.    Skin: Negative for rash.   Neurological: Negative for light-headedness and headaches. Psychiatric/Behavioral: Positive for confusion. Negative for agitation. PAST MEDICAL AND SURGICAL HISTORY     Past Medical History:   Diagnosis Date    Hypertension      Past Surgical History:   Procedure Laterality Date    APPENDECTOMY      CARDIAC PACEMAKER PLACEMENT      CARDIAC PACEMAKER PLACEMENT           MEDICATIONS   No current facility-administered medications for this encounter. Current Outpatient Medications:     amLODIPine (NORVASC) 10 MG tablet, Take 1 tablet by mouth daily, Disp: 30 tablet, Rfl: 3    metoprolol succinate (TOPROL XL) 50 MG extended release tablet, Take 1 tablet by mouth daily, Disp: 30 tablet, Rfl: 3    hydrALAZINE (APRESOLINE) 25 MG tablet, Take 1 tablet by mouth every 8 hours, Disp: 90 tablet, Rfl: 3      SOCIAL HISTORY     Social History     Social History Narrative    Not on file     Social History     Tobacco Use    Smoking status: Former Smoker    Smokeless tobacco: Never Used   Vaping Use    Vaping Use: Never used   Substance Use Topics    Alcohol use: Not Currently    Drug use: Never         ALLERGIES     Allergies   Allergen Reactions    Pcn [Penicillins]          FAMILY HISTORY   No family history on file. PREVIOUS RECORDS   Previous records reviewed: Patient was seen here on 8/7/2021 was admitted for altered mental status, hypertensive emergency. PHYSICAL EXAM     ED Triage Vitals   BP Temp Temp src Pulse Resp SpO2 Height Weight   -- -- -- -- -- -- -- --   Blood pressure 177/72, pulse 81, respiratory 24, SPO2 is 96%, temperature 97.6. Initial vital signs and nursing assessment reviewed and normal. Pulsoximetry is normal per my interpretation. Additional Vital Signs:  Vitals:    08/13/21 1203   BP: (!) 150/71   Pulse: 69   Resp: 15   Temp:    SpO2:        Physical Exam  Vitals and nursing note reviewed. Constitutional:       General: She is in acute distress. Appearance: She is ill-appearing. She is not toxic-appearing. HENT:      Head: Normocephalic and atraumatic. Right Ear: External ear normal.      Left Ear: External ear normal.      Nose: Nose normal.      Mouth/Throat:      Mouth: Mucous membranes are dry. Pharynx: Oropharynx is clear. Eyes:      General: No scleral icterus. Extraocular Movements: Extraocular movements intact. Conjunctiva/sclera: Conjunctivae normal.      Pupils: Pupils are equal, round, and reactive to light. Cardiovascular:      Rate and Rhythm: Normal rate and regular rhythm. Pulses: Normal pulses. Heart sounds: Normal heart sounds. No murmur heard. Comments: Radial pulses 2+ bilaterally, DP pulses 2+ bilaterally. Pulmonary:      Effort: Pulmonary effort is normal. No respiratory distress. Breath sounds: Normal breath sounds. No wheezing or rales. Comments: Substernal chest tenderness palpation. Chest:      Chest wall: Tenderness present. Abdominal:      General: Abdomen is flat. There is no distension. Palpations: Abdomen is soft. Tenderness: There is no abdominal tenderness. There is no guarding or rebound. Musculoskeletal:         General: Normal range of motion. Cervical back: Normal range of motion and neck supple. No rigidity. No muscular tenderness. Right lower leg: No edema. Left lower leg: No edema. Lymphadenopathy:      Cervical: No cervical adenopathy. Skin:     General: Skin is warm and dry. Capillary Refill: Capillary refill takes less than 2 seconds. Coloration: Skin is not jaundiced. Neurological:      General: No focal deficit present. Mental Status: She is alert and oriented to person, place, and time. Sensory: No sensory deficit. Motor: No weakness. Psychiatric:         Mood and Affect: Mood normal.         Behavior: Behavior normal.           MEDICAL DECISION MAKING   Initial Assessment:  This is a 80year-old female with past medical history of hypertension and a pacemaker placed in [place] many years ago per daughter for syncopal episodes with an irregular heart rate she states, patient was recently admitted on 8/7/2021 for hypertensive emergency as well as multiple mental status and mechanical fall with a negative CT head she was discharged in a 8/9/2021 and returned today for chest pressure over the last couple days as well as palpitations in the mornings lasting few hours at a time. Daughter states since her disposition the hospital she remains slightly confused per her baseline prior to her admission. Differential Diagnosis Included but not limited to: Metabolic derangement, electrolyte derangement, ACS, pneumonia, urinary tract infection, thyroid derangement, arrhythmia    MDM:   Patient's pacemaker was interrogated with no concerning signs, spoke to the pacemaker presenting who agrees with this. She will follow-up with her cardiologist next 3 days. Her laboratory studies are not concerning for any acute issues at this time. 2 surgeries within normal notes. She feels asymptomatic and is wanting to go home. She has good support at home she will be discharged for follow-up.         ED RESULTS   Laboratory results:  Labs Reviewed   CBC WITH AUTO DIFFERENTIAL - Abnormal; Notable for the following components:       Result Value    WBC 11.4 (*)     RDW-CV 14.6 (*)     RDW-SD 47.3 (*)     All other components within normal limits   BASIC METABOLIC PANEL W/ REFLEX TO MG FOR LOW K - Abnormal; Notable for the following components:    Glucose 110 (*)     All other components within normal limits   URINE WITH REFLEXED MICRO - Abnormal; Notable for the following components:    Protein, UA 30 (*)     Character, Urine CLOUDY (*)     All other components within normal limits   OSMOLALITY - Abnormal; Notable for the following components:    Osmolality Calc 269.1 (*)     All other components within normal limits   GLOMERULAR FILTRATION RATE, ESTIMATED - Abnormal; Notable for the following components:    Est, Glom Filt Rate 78 (*)     All other components within normal limits   TROPONIN   TSH WITH REFLEX   ANION GAP       Radiologic studies results:  XR CHEST PORTABLE   Final Result   1. No interval change since previous study dated 7 August 2021, no acute cardiopulmonary disease. .               **This report has been created using voice recognition software. It may contain minor errors which are inherent in voice recognition technology. **      Final report electronically signed by DR Keyshawn Fagan on 8/13/2021 9:24 AM          ED Medications administered this visit: Medications - No data to display      ED COURSE     ED Course as of Aug 13 1341   Fri Aug 13, 2021   2316 Mild leukocytosis, most likely reactionary   CBC Auto Differential(!):    WBC 11.4(!)   RBC 4.75   Hemoglobin Quant 13.6   Hematocrit 42.3   MCV 89.1   MCH 28.6   MCHC 32.2   RDW-CV 14.6(!)   RDW-SD 47.3(!)   Platelet Count 552   MPV 10.4   Seg Neutrophils 54.6   Lymphocytes 32.5   Monocytes 9.4   Eosinophils 2.1   Basophils 1.1   Immature Granulocytes 0.3   Segs Absolute 6.2   Lymphocytes Absolute 3.7   Monocytes Absolute 1.1   Eosinophils Absolute 0.2   Basophils Absolute 0.1   Immature Grans (Abs) 0.04   Nucleated Red Blood Cells 0 [AL]   0942 '   IMPRESSION:  1. No interval change since previous study dated 7 August 2021, no acute cardiopulmonary disease. .      \"   XR CHEST PORTABLE [AL]   7744 Within normal limits   Basic Metabolic Panel w/ Reflex to MG(!):    Sodium 135   Potassium 3.7   Chloride 98   CO2 23   Glucose 110(!)   BUN 8   Creatinine 0.7   Calcium 9.8 [AL]   0942 Osmolality Calc(!): 269.1 [AL]   0942 Anion Gap: 14.0 [AL]   0942 Est, Glom Filt Rate(!): 78 [AL]   0942 No signs of infection   Urine with Reflexed Micro(!):    Glucose, UA NEGATIVE   Bilirubin, Urine NEGATIVE   Ketones, Urine NEGATIVE   Specific Gravity, Urine 1.012   Blood, Urine NEGATIVE pH, UA 7.5   Protein, UA 30(!)   Urobilinogen, Urine 1.0   Nitrite, Urine NEGATIVE   Leukocyte Esterase, Urine NEGATIVE   Color, UA YELLOW   Character, Urine CLOUDY(!) [AL]   1020 Troponin T: < 0.010 [AL]   1020 TSH: 1.580 [AL]   1135 Multiple contacts to the pacemaker representative were made however the representative is in [place]. Will contact representatives here to interrogate the patient's pacemaker. She has been asymptomatic since arrival.  She is doing well she was updated on her laboratory studies. [AL]   6750 Pacemaker interrogation showed no significant arrhythmias or malfunction the patient's pacemaker. She will follow with a cardiologist in the next few days. She will be discharged. [AL]      ED Course User Index  [AL] Nate Tomlinson MD     Strict return precautions and follow up instructions were discussed with the patient prior to discharge, with which the patient agrees. MEDICATION CHANGES     Discharge Medication List as of 8/13/2021  1:00 PM            FINAL DISPOSITION     Final diagnoses:   Palpitations     Condition: condition: good  Dispo: Discharge to home      This transcription was electronically signed. Parts of this transcriptions may have been dictated by use of voice recognition software and electronically transcribed, and parts may have been transcribed with the assistance of an ED scribe. The transcription may contain errors not detected in proofreading. Please refer to my supervising physician's documentation if my documentation differs.     Electronically Signed: Nate Tomlinson MD, 08/13/21, 1:41 PM          Nate Tomlinson MD  Resident  08/13/21 1440

## 2021-08-13 NOTE — ED TRIAGE NOTES
Patient presents with concerns of tachycardia earlier. No tachycardia noted on the monitor currently.

## 2021-08-13 NOTE — ED NOTES
Attempted to interpret pacer. [employer] unable to connect with device.  Will attempt to call [address] [place] [address] interpretor      Renetta Bryan RN  08/13/21 1002

## 2021-08-13 NOTE — ED NOTES
Placed call to [employer] [address] device company to confirm that patients pacer is a [address] pacemaker. Rep states patients pacer is an [employer] device and was placed in [facility]. States he will page the local rep to come interpret device.       Ni Larose RN  08/13/21 [address] [place], RN  08/13/21 1012

## 2022-01-01 ENCOUNTER — APPOINTMENT (OUTPATIENT)
Dept: GENERAL RADIOLOGY | Age: 87
End: 2022-01-01
Payer: MEDICARE

## 2022-01-01 ENCOUNTER — HOSPITAL ENCOUNTER (OUTPATIENT)
Age: 87
Setting detail: OBSERVATION
Discharge: HOME | End: 2022-03-04
Attending: EMERGENCY MEDICINE | Admitting: INTERNAL MEDICINE
Payer: MEDICARE

## 2022-01-01 ENCOUNTER — APPOINTMENT (OUTPATIENT)
Dept: INTERVENTIONAL RADIOLOGY/VASCULAR | Age: 87
End: 2022-01-01
Payer: MEDICARE

## 2022-01-01 ENCOUNTER — HOSPITAL ENCOUNTER (EMERGENCY)
Age: 87
Discharge: HOME | End: 2022-03-24
Attending: STUDENT IN AN ORGANIZED HEALTH CARE EDUCATION/TRAINING PROGRAM
Payer: MEDICARE

## 2022-01-01 ENCOUNTER — APPOINTMENT (OUTPATIENT)
Dept: CT IMAGING | Age: 87
End: 2022-01-01
Payer: MEDICARE

## 2022-01-01 VITALS
DIASTOLIC BLOOD PRESSURE: 61 MMHG | SYSTOLIC BLOOD PRESSURE: 134 MMHG | BODY MASS INDEX: 23.92 KG/M2 | RESPIRATION RATE: 16 BRPM | HEART RATE: 86 BPM | TEMPERATURE: 98.4 F | OXYGEN SATURATION: 97 % | WEIGHT: 130 LBS | HEIGHT: 62 IN

## 2022-01-01 VITALS
HEIGHT: 62 IN | BODY MASS INDEX: 23.92 KG/M2 | WEIGHT: 130 LBS | HEART RATE: 84 BPM | RESPIRATION RATE: 19 BRPM | DIASTOLIC BLOOD PRESSURE: 89 MMHG | SYSTOLIC BLOOD PRESSURE: 130 MMHG | TEMPERATURE: 97.1 F | OXYGEN SATURATION: 97 %
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
## 2022-01-01 DIAGNOSIS — R41.0 CONFUSION: ICD-10-CM
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
## 2022-01-01 DIAGNOSIS — S01.01XA LACERATION OF SCALP, INITIAL ENCOUNTER: ICD-10-CM

## 2022-01-01 DIAGNOSIS — E86.0 DEHYDRATION: ICD-10-CM

## 2022-01-01 DIAGNOSIS — R26.2 UNABLE TO AMBULATE: ICD-10-CM

## 2022-01-01 LAB
ABO: NORMAL
ALBUMIN SERPL-MCNC: 3.4 G/DL (ref 3.5–5.1)
ALBUMIN SERPL-MCNC: 3.7 G/DL (ref 3.5–5.1)
ALBUMIN SERPL-MCNC: 3.8 G/DL (ref 3.5–5.1)
ALP BLD-CCNC: 122 U/L (ref 38–126)
ALP BLD-CCNC: 82 U/L (ref 38–126)
ALP BLD-CCNC: 85 U/L (ref 38–126)
ALT SERPL-CCNC: 10 U/L (ref 11–66)
ALT SERPL-CCNC: 9 U/L (ref 11–66)
ALT SERPL-CCNC: 9 U/L (ref 11–66)
ANION GAP SERPL CALCULATED.3IONS-SCNC: 14 MEQ/L (ref 8–16)
ANION GAP SERPL CALCULATED.3IONS-SCNC: 14 MEQ/L (ref 8–16)
ANION GAP SERPL CALCULATED.3IONS-SCNC: 16 MEQ/L (ref 8–16)
ANTIBODY SCREEN: NORMAL
AST SERPL-CCNC: 18 U/L (ref 5–40)
AST SERPL-CCNC: 21 U/L (ref 5–40)
AST SERPL-CCNC: 24 U/L (ref 5–40)
BACTERIA: ABNORMAL /HPF
BASOPHILS # BLD: 0.6 %
BASOPHILS # BLD: 0.7 %
BASOPHILS # BLD: 1.1 %
BASOPHILS ABSOLUTE: 0.1 THOU/MM3 (ref 0–0.1)
BILIRUB SERPL-MCNC: 0.6 MG/DL (ref 0.3–1.2)
BILIRUB SERPL-MCNC: 0.8 MG/DL (ref 0.3–1.2)
BILIRUB SERPL-MCNC: 1 MG/DL (ref 0.3–1.2)
BILIRUBIN DIRECT: < 0.2 MG/DL (ref 0–0.3)
BILIRUBIN URINE: NEGATIVE
BLOOD CULTURE, ROUTINE: ABNORMAL
BLOOD, URINE: NEGATIVE
BUN BLDV-MCNC: 11 MG/DL (ref 7–22)
BUN BLDV-MCNC: 14 MG/DL (ref 7–22)
BUN BLDV-MCNC: 27 MG/DL (ref 7–22)
CALCIUM SERPL-MCNC: 8.7 MG/DL (ref 8.5–10.5)
CALCIUM SERPL-MCNC: 9.2 MG/DL (ref 8.5–10.5)
CALCIUM SERPL-MCNC: 9.2 MG/DL (ref 8.5–10.5)
CASTS 2: ABNORMAL /LPF
CASTS UA: ABNORMAL /LPF
CHARACTER, URINE: CLEAR
CHLORIDE BLD-SCNC: 102 MEQ/L (ref 98–111)
CHLORIDE BLD-SCNC: 110 MEQ/L (ref 98–111)
CHLORIDE BLD-SCNC: 113 MEQ/L (ref 98–111)
CO2: 15 MEQ/L (ref 23–33)
CO2: 16 MEQ/L (ref 23–33)
CO2: 22 MEQ/L (ref 23–33)
COLOR: YELLOW
CREAT SERPL-MCNC: 0.6 MG/DL (ref 0.4–1.2)
CREAT SERPL-MCNC: 0.8 MG/DL (ref 0.4–1.2)
CREAT SERPL-MCNC: 0.9 MG/DL (ref 0.4–1.2)
CRYSTALS, UA: ABNORMAL
EKG ATRIAL RATE: 81 BPM
EKG ATRIAL RATE: 85 BPM
EKG P AXIS: 70 DEGREES
EKG P AXIS: 75 DEGREES
EKG P-R INTERVAL: 156 MS
EKG P-R INTERVAL: 158 MS
EKG Q-T INTERVAL: 374 MS
EKG Q-T INTERVAL: 382 MS
EKG QRS DURATION: 70 MS
EKG QRS DURATION: 76 MS
EKG QTC CALCULATION (BAZETT): 443 MS
EKG QTC CALCULATION (BAZETT): 445 MS
EKG R AXIS: -6 DEGREES
EKG T AXIS: 62 DEGREES
EKG T AXIS: 66 DEGREES
EKG VENTRICULAR RATE: 81 BPM
EKG VENTRICULAR RATE: 85 BPM
EOSINOPHIL # BLD: 0.7 %
EOSINOPHIL # BLD: 1.5 %
EOSINOPHIL # BLD: 2.6 %
EOSINOPHILS ABSOLUTE: 0.1 THOU/MM3 (ref 0–0.4)
EOSINOPHILS ABSOLUTE: 0.2 THOU/MM3 (ref 0–0.4)
EOSINOPHILS ABSOLUTE: 0.3 THOU/MM3 (ref 0–0.4)
EPITHELIAL CELLS, UA: ABNORMAL /HPF
ERYTHROCYTE [DISTWIDTH] IN BLOOD BY AUTOMATED COUNT: 13.2 % (ref 11.5–14.5)
ERYTHROCYTE [DISTWIDTH] IN BLOOD BY AUTOMATED COUNT: 13.2 % (ref 11.5–14.5)
ERYTHROCYTE [DISTWIDTH] IN BLOOD BY AUTOMATED COUNT: 14.1 % (ref 11.5–14.5)
ERYTHROCYTE [DISTWIDTH] IN BLOOD BY AUTOMATED COUNT: 45.1 FL (ref 35–45)
ERYTHROCYTE [DISTWIDTH] IN BLOOD BY AUTOMATED COUNT: 46.3 FL (ref 35–45)
ERYTHROCYTE [DISTWIDTH] IN BLOOD BY AUTOMATED COUNT: 48 FL (ref 35–45)
FOLATE: 11.8 NG/ML (ref 4.8–24.2)
GFR SERPL CREATININE-BSD FRML MDRD: 58 ML/MIN/1.73M2
GFR SERPL CREATININE-BSD FRML MDRD: 67 ML/MIN/1.73M2
GFR SERPL CREATININE-BSD FRML MDRD: > 90 ML/MIN/1.73M2
GLUCOSE BLD-MCNC: 100 MG/DL (ref 70–108)
GLUCOSE BLD-MCNC: 166 MG/DL (ref 70–108)
GLUCOSE BLD-MCNC: 94 MG/DL
GLUCOSE BLD-MCNC: 94 MG/DL (ref 70–108)
GLUCOSE BLD-MCNC: 94 MG/DL (ref 70–108)
GLUCOSE URINE: NEGATIVE MG/DL
HCT VFR BLD CALC: 37 % (ref 37–47)
HCT VFR BLD CALC: 38.3 % (ref 37–47)
HCT VFR BLD CALC: 40.5 % (ref 37–47)
HEMOGLOBIN: 11.6 GM/DL (ref 12–16)
HEMOGLOBIN: 11.9 GM/DL (ref 12–16)
HEMOGLOBIN: 12.8 GM/DL (ref 12–16)
IMMATURE GRANS (ABS): 0.06 THOU/MM3 (ref 0–0.07)
IMMATURE GRANULOCYTES: 0.4 %
IMMATURE GRANULOCYTES: 0.5 %
IMMATURE GRANULOCYTES: 0.5 %
KETONES, URINE: ABNORMAL
LACTIC ACID: 1.4 MMOL/L (ref 0.5–2)
LACTIC ACID: 2.4 MMOL/L (ref 0.5–2)
LEUKOCYTE ESTERASE, URINE: ABNORMAL
LIPASE: 26 U/L (ref 5.6–51.3)
LYMPHOCYTES # BLD: 14.9 %
LYMPHOCYTES # BLD: 19 %
LYMPHOCYTES # BLD: 20.2 %
LYMPHOCYTES ABSOLUTE: 1.8 THOU/MM3 (ref 1–4.8)
LYMPHOCYTES ABSOLUTE: 2.4 THOU/MM3 (ref 1–4.8)
LYMPHOCYTES ABSOLUTE: 2.7 THOU/MM3 (ref 1–4.8)
MAGNESIUM: 2.4 MG/DL (ref 1.6–2.4)
MCH RBC QN AUTO: 29.1 PG (ref 26–33)
MCH RBC QN AUTO: 29.6 PG (ref 26–33)
MCH RBC QN AUTO: 29.7 PG (ref 26–33)
MCHC RBC AUTO-ENTMCNC: 31.1 GM/DL (ref 32.2–35.5)
MCHC RBC AUTO-ENTMCNC: 31.4 GM/DL (ref 32.2–35.5)
MCHC RBC AUTO-ENTMCNC: 31.6 GM/DL (ref 32.2–35.5)
MCV RBC AUTO: 93 FL (ref 81–99)
MCV RBC AUTO: 93.8 FL (ref 81–99)
MCV RBC AUTO: 95.5 FL (ref 81–99)
MISCELLANEOUS 2: ABNORMAL
MONOCYTES # BLD: 7.2 %
MONOCYTES # BLD: 8.4 %
MONOCYTES # BLD: 8.8 %
MONOCYTES ABSOLUTE: 0.9 THOU/MM3 (ref 0.4–1.3)
MONOCYTES ABSOLUTE: 1 THOU/MM3 (ref 0.4–1.3)
MONOCYTES ABSOLUTE: 1.2 THOU/MM3 (ref 0.4–1.3)
NITRITE, URINE: NEGATIVE
NUCLEATED RED BLOOD CELLS: 0 /100 WBC
ORGANISM: ABNORMAL
ORGANISM: ABNORMAL
OSMOLALITY CALCULATION: 278.8 MOSMOL/KG (ref 275–300)
OSMOLALITY CALCULATION: 286.5 MOSMOL/KG (ref 275–300)
PH UA: 6 (ref 5–9)
PLATELET # BLD: 292 THOU/MM3 (ref 130–400)
PLATELET # BLD: 321 THOU/MM3 (ref 130–400)
PLATELET # BLD: 415 THOU/MM3 (ref 130–400)
PMV BLD AUTO: 10.2 FL (ref 9.4–12.4)
PMV BLD AUTO: 10.6 FL (ref 9.4–12.4)
PMV BLD AUTO: 10.7 FL (ref 9.4–12.4)
POTASSIUM REFLEX MAGNESIUM: 3.7 MEQ/L (ref 3.5–5.2)
POTASSIUM REFLEX MAGNESIUM: 3.8 MEQ/L (ref 3.5–5.2)
POTASSIUM SERPL-SCNC: 4.1 MEQ/L (ref 3.5–5.2)
PRO-BNP: 314.4 PG/ML (ref 0–1800)
PRO-BNP: 624.1 PG/ML (ref 0–1800)
PROCALCITONIN: 0.1 NG/ML (ref 0.01–0.09)
PROTEIN UA: 30
RBC # BLD: 3.98 MILL/MM3 (ref 4.2–5.4)
RBC # BLD: 4.01 MILL/MM3 (ref 4.2–5.4)
RBC # BLD: 4.32 MILL/MM3 (ref 4.2–5.4)
RBC URINE: ABNORMAL /HPF
RENAL EPITHELIAL, UA: ABNORMAL
RH FACTOR: NORMAL
SARS-COV-2, NAAT: NOT  DETECTED
SARS-COV-2, NAAT: NOT  DETECTED
SEG NEUTROPHILS: 68.7 %
SEG NEUTROPHILS: 70.5 %
SEG NEUTROPHILS: 73.7 %
SEGMENTED NEUTROPHILS ABSOLUTE COUNT: 8.1 THOU/MM3 (ref 1.8–7.7)
SEGMENTED NEUTROPHILS ABSOLUTE COUNT: 8.8 THOU/MM3 (ref 1.8–7.7)
SEGMENTED NEUTROPHILS ABSOLUTE COUNT: 9.9 THOU/MM3 (ref 1.8–7.7)
SODIUM BLD-SCNC: 138 MEQ/L (ref 135–145)
SODIUM BLD-SCNC: 141 MEQ/L (ref 135–145)
SODIUM BLD-SCNC: 143 MEQ/L (ref 135–145)
SPECIFIC GRAVITY, URINE: 1.02 (ref 1–1.03)
T4 FREE: 1.35 NG/DL (ref 0.93–1.76)
TOTAL CK: 212 U/L (ref 30–135)
TOTAL PROTEIN: 6.8 G/DL (ref 6.1–8)
TOTAL PROTEIN: 7.4 G/DL (ref 6.1–8)
TOTAL PROTEIN: 7.4 G/DL (ref 6.1–8)
TROPONIN T: < 0.01 NG/ML
TROPONIN T: < 0.01 NG/ML
TSH SERPL DL<=0.05 MIU/L-ACNC: 1.62 UIU/ML (ref 0.4–4.2)
UROBILINOGEN, URINE: 1 EU/DL (ref 0–1)
VITAMIN B-12: 494 PG/ML (ref 211–911)
WBC # BLD: 11.8 THOU/MM3 (ref 4.8–10.8)
WBC # BLD: 11.9 THOU/MM3 (ref 4.8–10.8)
WBC # BLD: 14 THOU/MM3 (ref 4.8–10.8)
WBC UA: ABNORMAL /HPF
YEAST: ABNORMAL

## 2022-01-01 PROCEDURE — 6360000002 HC RX W HCPCS

## 2022-01-01 PROCEDURE — 83735 ASSAY OF MAGNESIUM: CPT

## 2022-01-01 PROCEDURE — 99226 PR SBSQ OBSERVATION CARE/DAY 35 MINUTES: CPT | Performed by: INTERNAL MEDICINE

## 2022-01-01 PROCEDURE — 84484 ASSAY OF TROPONIN QUANT: CPT

## 2022-01-01 PROCEDURE — G0378 HOSPITAL OBSERVATION PER HR: HCPCS

## 2022-01-01 PROCEDURE — 84145 PROCALCITONIN (PCT): CPT

## 2022-01-01 PROCEDURE — 99225 PR SBSQ OBSERVATION CARE/DAY 25 MINUTES: CPT | Performed by: INTERNAL MEDICINE

## 2022-01-01 PROCEDURE — 73060 X-RAY EXAM OF HUMERUS: CPT

## 2022-01-01 PROCEDURE — 36415 COLL VENOUS BLD VENIPUNCTURE: CPT

## 2022-01-01 PROCEDURE — 85025 COMPLETE CBC W/AUTO DIFF WBC: CPT

## 2022-01-01 PROCEDURE — 97162 PT EVAL MOD COMPLEX 30 MIN: CPT

## 2022-01-01 PROCEDURE — 80053 COMPREHEN METABOLIC PANEL: CPT

## 2022-01-01 PROCEDURE — 99217 PR OBSERVATION CARE DISCHARGE MANAGEMENT: CPT | Performed by: INTERNAL MEDICINE

## 2022-01-01 PROCEDURE — 90715 TDAP VACCINE 7 YRS/> IM: CPT | Performed by: STUDENT IN AN ORGANIZED HEALTH CARE EDUCATION/TRAINING PROGRAM

## 2022-01-01 PROCEDURE — 87040 BLOOD CULTURE FOR BACTERIA: CPT

## 2022-01-01 PROCEDURE — 93970 EXTREMITY STUDY: CPT

## 2022-01-01 PROCEDURE — 6360000002 HC RX W HCPCS: Performed by: STUDENT IN AN ORGANIZED HEALTH CARE EDUCATION/TRAINING PROGRAM

## 2022-01-01 PROCEDURE — 72125 CT NECK SPINE W/O DYE: CPT

## 2022-01-01 PROCEDURE — 96372 THER/PROPH/DIAG INJ SC/IM: CPT

## 2022-01-01 PROCEDURE — 90471 IMMUNIZATION ADMIN: CPT | Performed by: STUDENT IN AN ORGANIZED HEALTH CARE EDUCATION/TRAINING PROGRAM

## 2022-01-01 PROCEDURE — 97535 SELF CARE MNGMENT TRAINING: CPT

## 2022-01-01 PROCEDURE — 97530 THERAPEUTIC ACTIVITIES: CPT

## 2022-01-01 PROCEDURE — 97110 THERAPEUTIC EXERCISES: CPT

## 2022-01-01 PROCEDURE — 84443 ASSAY THYROID STIM HORMONE: CPT

## 2022-01-01 PROCEDURE — 71045 X-RAY EXAM CHEST 1 VIEW: CPT

## 2022-01-01 PROCEDURE — 99284 EMERGENCY DEPT VISIT MOD MDM: CPT

## 2022-01-01 PROCEDURE — 82746 ASSAY OF FOLIC ACID SERUM: CPT

## 2022-01-01 PROCEDURE — 87635 SARS-COV-2 COVID-19 AMP PRB: CPT

## 2022-01-01 PROCEDURE — 93005 ELECTROCARDIOGRAM TRACING: CPT | Performed by: EMERGENCY MEDICINE

## 2022-01-01 PROCEDURE — 6370000000 HC RX 637 (ALT 250 FOR IP)

## 2022-01-01 PROCEDURE — 86850 RBC ANTIBODY SCREEN: CPT

## 2022-01-01 PROCEDURE — 83880 ASSAY OF NATRIURETIC PEPTIDE: CPT

## 2022-01-01 PROCEDURE — 81001 URINALYSIS AUTO W/SCOPE: CPT

## 2022-01-01 PROCEDURE — 82948 REAGENT STRIP/BLOOD GLUCOSE: CPT

## 2022-01-01 PROCEDURE — 96360 HYDRATION IV INFUSION INIT: CPT

## 2022-01-01 PROCEDURE — 2580000003 HC RX 258

## 2022-01-01 PROCEDURE — 70450 CT HEAD/BRAIN W/O DYE: CPT

## 2022-01-01 PROCEDURE — 72170 X-RAY EXAM OF PELVIS: CPT

## 2022-01-01 PROCEDURE — 6820000002 HC L2 INJURY CALL ACTIVATION: Performed by: SURGERY

## 2022-01-01 PROCEDURE — 80048 BASIC METABOLIC PNL TOTAL CA: CPT

## 2022-01-01 PROCEDURE — 93010 ELECTROCARDIOGRAM REPORT: CPT | Performed by: INTERNAL MEDICINE

## 2022-01-01 PROCEDURE — 82550 ASSAY OF CK (CPK): CPT

## 2022-01-01 PROCEDURE — 99285 EMERGENCY DEPT VISIT HI MDM: CPT

## 2022-01-01 PROCEDURE — 92610 EVALUATE SWALLOWING FUNCTION: CPT

## 2022-01-01 PROCEDURE — 2580000003 HC RX 258: Performed by: INTERNAL MEDICINE

## 2022-01-01 PROCEDURE — 97166 OT EVAL MOD COMPLEX 45 MIN: CPT

## 2022-01-01 PROCEDURE — 94760 N-INVAS EAR/PLS OXIMETRY 1: CPT

## 2022-01-01 PROCEDURE — 86901 BLOOD TYPING SEROLOGIC RH(D): CPT

## 2022-01-01 PROCEDURE — 82607 VITAMIN B-12: CPT

## 2022-01-01 PROCEDURE — 80076 HEPATIC FUNCTION PANEL: CPT

## 2022-01-01 PROCEDURE — 93005 ELECTROCARDIOGRAM TRACING: CPT | Performed by: STUDENT IN AN ORGANIZED HEALTH CARE EDUCATION/TRAINING PROGRAM

## 2022-01-01 PROCEDURE — APPSS180 APP SPLIT SHARED TIME > 60 MINUTES: Performed by: NURSE PRACTITIONER

## 2022-01-01 PROCEDURE — 83605 ASSAY OF LACTIC ACID: CPT

## 2022-01-01 PROCEDURE — 6370000000 HC RX 637 (ALT 250 FOR IP): Performed by: INTERNAL MEDICINE

## 2022-01-01 PROCEDURE — 83690 ASSAY OF LIPASE: CPT

## 2022-01-01 PROCEDURE — 99219 PR INITIAL OBSERVATION CARE/DAY 50 MINUTES: CPT | Performed by: INTERNAL MEDICINE

## 2022-01-01 PROCEDURE — 12001 RPR S/N/AX/GEN/TRNK 2.5CM/<: CPT

## 2022-01-01 PROCEDURE — 2580000003 HC RX 258: Performed by: EMERGENCY MEDICINE

## 2022-01-01 PROCEDURE — 86900 BLOOD TYPING SEROLOGIC ABO: CPT

## 2022-01-01 PROCEDURE — 96361 HYDRATE IV INFUSION ADD-ON: CPT

## 2022-01-01 PROCEDURE — 84439 ASSAY OF FREE THYROXINE: CPT

## 2022-01-01 RX ORDER — SODIUM CHLORIDE 9 MG/ML
INJECTION, SOLUTION INTRAVENOUS CONTINUOUS
Status: DISCONTINUED | OUTPATIENT
Start: 2022-01-01 | End: 2022-01-01

## 2022-01-01 RX ORDER — HYDRALAZINE HYDROCHLORIDE 20 MG/ML
5 INJECTION INTRAMUSCULAR; INTRAVENOUS EVERY 6 HOURS PRN
Status: DISCONTINUED | OUTPATIENT
Start: 2022-01-01 | End: 2022-01-01 | Stop reason: HOSPADM

## 2022-01-01 RX ORDER — POLYETHYLENE GLYCOL 3350 17 G/17G
17 POWDER, FOR SOLUTION ORAL DAILY PRN
Qty: 527 G | Refills: 1 | DISCHARGE
Start: 2022-01-01 | End: 2022-01-01

## 2022-01-01 RX ORDER — SODIUM CHLORIDE 9 MG/ML
25 INJECTION, SOLUTION INTRAVENOUS PRN
Status: DISCONTINUED | OUTPATIENT
Start: 2022-01-01 | End: 2022-01-01 | Stop reason: HOSPADM

## 2022-01-01 RX ORDER — SODIUM CHLORIDE, SODIUM LACTATE, POTASSIUM CHLORIDE, CALCIUM CHLORIDE 600; 310; 30; 20 MG/100ML; MG/100ML; MG/100ML; MG/100ML
INJECTION, SOLUTION INTRAVENOUS CONTINUOUS
Status: DISCONTINUED | OUTPATIENT
Start: 2022-01-01 | End: 2022-01-01

## 2022-01-01 RX ORDER — METOPROLOL SUCCINATE 50 MG/1
50 TABLET, EXTENDED RELEASE ORAL DAILY
Qty: 30 TABLET | Refills: 3 | DISCHARGE
Start: 2022-01-01

## 2022-01-01 RX ORDER — SODIUM CHLORIDE 0.9 % (FLUSH) 0.9 %
5-40 SYRINGE (ML) INJECTION PRN
Status: DISCONTINUED | OUTPATIENT
Start: 2022-01-01 | End: 2022-01-01 | Stop reason: HOSPADM

## 2022-01-01 RX ORDER — 0.9 % SODIUM CHLORIDE 0.9 %
30 INTRAVENOUS SOLUTION INTRAVENOUS ONCE
Status: COMPLETED | OUTPATIENT
Start: 2022-01-01 | End: 2022-01-01

## 2022-01-01 RX ORDER — AMLODIPINE BESYLATE 5 MG/1
5 TABLET ORAL DAILY
Qty: 30 TABLET | Refills: 3 | DISCHARGE
Start: 2022-01-01

## 2022-01-01 RX ORDER — ACETAMINOPHEN 650 MG/1
650 SUPPOSITORY RECTAL EVERY 6 HOURS PRN
Status: DISCONTINUED | OUTPATIENT
Start: 2022-01-01 | End: 2022-01-01 | Stop reason: HOSPADM

## 2022-01-01 RX ORDER — ACETAMINOPHEN 325 MG/1
650 TABLET ORAL EVERY 6 HOURS PRN
Status: DISCONTINUED | OUTPATIENT
Start: 2022-01-01 | End: 2022-01-01 | Stop reason: HOSPADM

## 2022-01-01 RX ORDER — MEGESTROL ACETATE 40 MG/1
40 TABLET ORAL 2 TIMES DAILY
Status: ON HOLD | COMMUNITY
End: 2022-01-01 | Stop reason: HOSPADM

## 2022-01-01 RX ORDER — ONDANSETRON 2 MG/ML
4 INJECTION INTRAMUSCULAR; INTRAVENOUS EVERY 6 HOURS PRN
Status: DISCONTINUED | OUTPATIENT
Start: 2022-01-01 | End: 2022-01-01 | Stop reason: HOSPADM

## 2022-01-01 RX ORDER — POLYETHYLENE GLYCOL 3350 17 G/17G
17 POWDER, FOR SOLUTION ORAL DAILY PRN
Status: DISCONTINUED | OUTPATIENT
Start: 2022-01-01 | End: 2022-01-01 | Stop reason: HOSPADM

## 2022-01-01 RX ORDER — AMLODIPINE BESYLATE 5 MG/1
5 TABLET ORAL DAILY
Status: DISCONTINUED | OUTPATIENT
Start: 2022-01-01 | End: 2022-01-01 | Stop reason: HOSPADM

## 2022-01-01 RX ORDER — ONDANSETRON 4 MG/1
4 TABLET, ORALLY DISINTEGRATING ORAL EVERY 8 HOURS PRN
Status: DISCONTINUED | OUTPATIENT
Start: 2022-01-01 | End: 2022-01-01 | Stop reason: HOSPADM

## 2022-01-01 RX ORDER — SODIUM CHLORIDE 0.9 % (FLUSH) 0.9 %
5-40 SYRINGE (ML) INJECTION EVERY 12 HOURS SCHEDULED
Status: DISCONTINUED | OUTPATIENT
Start: 2022-01-01 | End: 2022-01-01 | Stop reason: HOSPADM

## 2022-01-01 RX ORDER — METOPROLOL SUCCINATE 50 MG/1
50 TABLET, EXTENDED RELEASE ORAL DAILY
Status: DISCONTINUED | OUTPATIENT
Start: 2022-01-01 | End: 2022-01-01 | Stop reason: HOSPADM

## 2022-01-01 RX ADMIN — AMLODIPINE BESYLATE 5 MG: 5 TABLET ORAL at 08:03

## 2022-01-01 RX ADMIN — AMLODIPINE BESYLATE 5 MG: 5 TABLET ORAL at 08:21

## 2022-01-01 RX ADMIN — ENOXAPARIN SODIUM 30 MG: 100 INJECTION SUBCUTANEOUS at 08:21

## 2022-01-01 RX ADMIN — METOPROLOL SUCCINATE 50 MG: 50 TABLET, EXTENDED RELEASE ORAL at 18:36

## 2022-01-01 RX ADMIN — ENOXAPARIN SODIUM 30 MG: 100 INJECTION SUBCUTANEOUS at 08:16

## 2022-01-01 RX ADMIN — METOPROLOL SUCCINATE 50 MG: 50 TABLET, EXTENDED RELEASE ORAL at 08:17

## 2022-01-01 RX ADMIN — AMLODIPINE BESYLATE 5 MG: 5 TABLET ORAL at 18:43

## 2022-01-01 RX ADMIN — AMLODIPINE BESYLATE 5 MG: 5 TABLET ORAL at 08:17

## 2022-01-01 RX ADMIN — METOPROLOL SUCCINATE 50 MG: 50 TABLET, EXTENDED RELEASE ORAL at 08:21

## 2022-01-01 RX ADMIN — ENOXAPARIN SODIUM 30 MG: 100 INJECTION SUBCUTANEOUS at 18:45

## 2022-01-01 RX ADMIN — APIXABAN 10 MG: 5 TABLET, FILM COATED ORAL at 14:18

## 2022-01-01 RX ADMIN — SODIUM CHLORIDE, POTASSIUM CHLORIDE, SODIUM LACTATE AND CALCIUM CHLORIDE: 600; 310; 30; 20 INJECTION, SOLUTION INTRAVENOUS at 09:53

## 2022-01-01 RX ADMIN — ENOXAPARIN SODIUM 30 MG: 100 INJECTION SUBCUTANEOUS at 18:35

## 2022-01-01 RX ADMIN — SODIUM CHLORIDE 1770 ML: 9 INJECTION, SOLUTION INTRAVENOUS at 12:01

## 2022-01-01 RX ADMIN — METOPROLOL SUCCINATE 50 MG: 50 TABLET, EXTENDED RELEASE ORAL at 08:03

## 2022-01-01 RX ADMIN — AMLODIPINE BESYLATE 5 MG: 5 TABLET ORAL at 08:18

## 2022-01-01 RX ADMIN — SODIUM CHLORIDE: 9 INJECTION, SOLUTION INTRAVENOUS at 18:42

## 2022-01-01 RX ADMIN — SODIUM CHLORIDE, PRESERVATIVE FREE 10 ML: 5 INJECTION INTRAVENOUS at 09:50

## 2022-01-01 RX ADMIN — TETANUS TOXOID, REDUCED DIPHTHERIA TOXOID AND ACELLULAR PERTUSSIS VACCINE, ADSORBED 0.5 ML: 5; 2.5; 8; 8; 2.5 SUSPENSION INTRAMUSCULAR at 18:53

## 2022-01-01 RX ADMIN — METOPROLOL SUCCINATE 50 MG: 50 TABLET, EXTENDED RELEASE ORAL at 18:43

## 2022-01-01 RX ADMIN — ENOXAPARIN SODIUM 30 MG: 100 INJECTION SUBCUTANEOUS at 08:03

## 2022-01-01 RX ADMIN — ENOXAPARIN SODIUM 30 MG: 100 INJECTION SUBCUTANEOUS at 08:18

## 2022-01-01 RX ADMIN — ACETAMINOPHEN 650 MG: 325 TABLET ORAL at 18:40

## 2022-01-01 RX ADMIN — AMLODIPINE BESYLATE 5 MG: 5 TABLET ORAL at 17:30

## 2022-01-01 RX ADMIN — METOPROLOL SUCCINATE 50 MG: 50 TABLET, EXTENDED RELEASE ORAL at 08:18

## 2022-01-01 ASSESSMENT — PAIN SCALES - WONG BAKER

## 2022-01-01 ASSESSMENT — VISUAL ACUITY: OU: 1

## 2022-01-01 ASSESSMENT — PAIN SCALES - GENERAL
PAINLEVEL_OUTOF10: 0

## 2022-01-01 ASSESSMENT — PAIN - FUNCTIONAL ASSESSMENT
PAIN_FUNCTIONAL_ASSESSMENT: ADVANCED DEMENTIA
PAIN_FUNCTIONAL_ASSESSMENT: ADVANCED DEMENTIA

## 2022-01-01 ASSESSMENT — PAIN DESCRIPTION - LOCATION: LOCATION: GENERALIZED

## 2022-01-01 ASSESSMENT — PAIN DESCRIPTION - FREQUENCY: FREQUENCY: CONTINUOUS
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
## 2022-02-27 NOTE — ACP (ADVANCE CARE PLANNING)
Advance Care Planning     Advance Care Planning Activator (Inpatient)  Conversation Note      Date of ACP Conversation: 2/27/2022     Conversation Conducted with:  Healthcare Decision Maker: Next of Kin by law (only applies in absence of above) (name) Gabriella Ruiz    ACP Activator: Jose Guadalupe Carlson:     Current Designated Health Care Decision Maker:     Primary Decision Maker: Rachelle Augustin [facility] - [phone]    Secondary Decision Maker: Candace Stewart - [phone]    Today we discussed [address]. The patient is considering options. Care Preferences    Ventilation: \"If you were in your present state of health and suddenly became very ill and were unable to breathe on your own, what would your preference be about the use of a ventilator (breathing machine) if it were available to you? \"      Would the patient desire the use of ventilator (breathing machine)?: no    \"If your health worsens and it becomes clear that your chance of recovery is unlikely, what would your preference be about the use of a ventilator (breathing machine) if it were available to you? \"     Would the patient desire the use of ventilator (breathing machine)?: No      Resuscitation  \"CPR works best to restart the heart when there is a sudden event, like a heart attack, in someone who is otherwise healthy. Unfortunately, CPR does not typically restart the heart for people who have serious health conditions or who are very sick. \"    \"In the event your heart stopped as a result of an underlying serious health condition, would you want attempts to be made to restart your heart (answer \"yes\" for attempt to resuscitate) or would you prefer a natural death (answer \"no\" for do not attempt to resuscitate)? \" no       [x] Yes   [] No   Educated Patient / David Dominguez regarding differences between Advance Directives and portable DNR orders.     Length of ACP Conversation in minutes: 62    Conversation Outcomes:  [x] ACP discussion completed  [x] Existing advance directive reviewed with patient; no changes to patient's previously recorded wishes  [] New Advance Directive completed  [] Portable Do Not Rescitate prepared for Provider review and signature  [] POLST/POST/MOLST/MOST prepared for Provider review and signature      Follow-up plan:    [] Schedule follow-up conversation to continue planning  [x] Referred individual to Provider for additional questions/concerns   [] Advised patient/agent/surrogate to review completed ACP document and update if needed with changes in condition, patient preferences or care setting    [x] This note routed to one or more involved healthcare providers     - Heena Purcell (No capacity) and her daughter were part of the ACP conversation. Heena Purcell is a DNR-CCA however the daughter Jesus Rey) stated \"No intubation. \" Arelis Allen admits that her mom has end stage dementia. She has lost over 20 #s in the last year. She no longer walks on her own. Eats and drinks very little. - While speaking with Arelis Allen we reviewed her mom's situation. She is living at home with Arelis Allen. Luzma's children come over daily to assist in her care. Mom has been sleeping in her chair but was found on the floor this morning.   - Arelis Allen is a bit overwhelmed with the level of care her mom is now requiring. It was suggested by the ED doc (Dr Naveed Mendez) that she consider an ECF. Arelis Allen refused to do that to her, stating she would never forgive her. She was able to manage her dad's care at home, she should be able to do this too. Arelis Allen asked Dr Naveed Mendez about providing a hospice consult. Arelis Allen had many questions about hispice and this staff offered some of the qualifications that are needed to comply. Dr Naveed Mendez expressed agreement with this level of care and will place the consult accordingly.   - Follow-up will be by the Hospice team either in/outpatient, contingent upon the patient's admission status.  Daughter was provided with the hospice

## 2022-02-27 NOTE — ED NOTES
Pt transported to [facility] on cart in stable condition. Floor contacted before transport. Spoke with Steve Padilla.      Wolf Duran  02/27/22 3275

## 2022-02-27 NOTE — ED PROVIDER NOTES
[address] ENCOUNTER      PATIENT NAME: Se Hagen  MRN: [id number]  : 1928  JACOB: 2022  PROVIDER: Jessika Moulton MD      CHIEF COMPLAINT       Chief Complaint   Patient presents with   Ottie Darius Dementia       Patient is seen and evaluated in a timely fashion. Nurses Notes are reviewed and I agree except as noted in the HPI. HISTORY OF PRESENT ILLNESS    Se Hagen is a 80 y.o. female who presents to Emergency Department with Fall and Dementia     This patient is a 80-year-old female brought in by EMS for evaluation of fall and altered mental status (EMS reports patient has baseline dementia). Patient has a [place]. EMS reports patient lives with her child who called ambulance. On arrival,  ptient has significant confusion (not sure if this is her baseline or not). Later her daughter arrived. Her daughter states patient normally gets up and makes her coffee. Patient has extremely hard hearing and poor vision. Patient has some baseline function such was walking with a walker and making coffee for herself. Patient has baseline dementia since 2021. Since 2022, patient seemed weak, and she rarely got out off bed. Patient was found on the floor 5:00 AM today and her daughter just let patient lying there sleeping, later, her daughter found she could not make patient get up therefore she called the ambulance. She touches cold. She has no trauma. She seems not in pain on arrival.  No history can be obtained from patient. No family at the bedside. This HPI was provided by EMS and daughter. Code status DNRCCA.      REVIEW OF SYSTEMS   Ten-point review of systems is negative except those documented in above HPI including constitutional, HEENT, respiratory, cardiovascular, gastrointestinal, genitourinary, musculoskeletal, skin, neurological, hematological and behavioral.      PAST MEDICAL HISTORY     Past Medical History:   Diagnosis Date    Hypertension        SURGICAL HISTORY       Past Surgical History:   Procedure Laterality Date    APPENDECTOMY      CARDIAC PACEMAKER PLACEMENT      CARDIAC PACEMAKER PLACEMENT         CURRENT MEDICATIONS       Previous Medications    AMLODIPINE (NORVASC) 10 MG TABLET    Take 1 tablet by mouth daily    HYDRALAZINE (APRESOLINE) 25 MG TABLET    Take 1 tablet by mouth every 8 hours    METOPROLOL SUCCINATE (TOPROL XL) 50 MG EXTENDED RELEASE TABLET    Take 1 tablet by mouth daily       ALLERGIES     Pcn [penicillins]    FAMILY HISTORY     has no family status information on file. family history is not on file. SOCIAL HISTORY      reports that she has quit smoking. She has never used smokeless tobacco. She reports previous alcohol use. She reports that she does not use drugs. PHYSICAL EXAM      height is 5' 2\" (1.575 m) and weight is 130 lb (59 kg). Her rectal temperature is 98.1 °F (36.7 °C) (significant). Her blood pressure is 159/81 (abnormal) and her pulse is 93. Her respiration is 18 and oxygen saturation is 99%. Physical Exam  Vitals and nursing note reviewed. Constitutional:       Appearance: She is well-developed. She is not diaphoretic. Comments: She touches cold. HENT:      Head: Normocephalic and atraumatic. Nose: Nose normal.   Eyes:      General: No scleral icterus. Right eye: No discharge. Left eye: No discharge. Conjunctiva/sclera: Conjunctivae normal.      Pupils: Pupils are equal, round, and reactive to light. Neck:      Vascular: No JVD. Trachea: No tracheal deviation. Cardiovascular:      Rate and Rhythm: Normal rate and regular rhythm. Heart sounds: Normal heart sounds. No murmur heard. No friction rub. No gallop. Pulmonary:      Effort: Pulmonary effort is normal. No respiratory distress. Breath sounds: Normal breath sounds. No stridor. No wheezing or rales. Chest:      Chest wall: No tenderness. Abdominal:      General: Bowel sounds are normal. There is no distension. Palpations: Abdomen is soft. There is no mass. Tenderness: There is no abdominal tenderness. There is no guarding or rebound. Hernia: No hernia is present. Musculoskeletal:         General: No tenderness or deformity. Cervical back: Normal range of motion and neck supple. Lymphadenopathy:      Cervical: No cervical adenopathy. Skin:     General: Skin is warm and dry. Capillary Refill: Capillary refill takes less than 2 seconds. Coloration: Skin is not pale. Findings: No erythema or rash. Neurological:      Mental Status: She is alert. She is disoriented. Cranial Nerves: No cranial nerve deficit. Sensory: No sensory deficit. Motor: No abnormal muscle tone. Coordination: Coordination normal.      Deep Tendon Reflexes: Reflexes normal.       ANCILLARY TEST RESULTS   EKG: Interpreted by me  Normal sinus rhythm, ventricular rate 85 bpm, MS interval 156 ms, QRS duration 70 ms,  ms, no ST elevation or acute T wave.     LAB RESULTS:  Results for orders placed or performed during the hospital encounter of 02/27/22   COVID-19, Rapid   Result Value Ref Range    SARS-CoV-2, NAAT NOT  DETECTED NOT DETECTED   CBC with Auto Differential   Result Value Ref Range    WBC 14.0 (H) 4.8 - 10.8 thou/mm3    RBC 4.32 4.20 - 5.40 mill/mm3    Hemoglobin 12.8 12.0 - 16.0 gm/dl    Hematocrit 40.5 37.0 - 47.0 %    MCV 93.8 81.0 - 99.0 fL    MCH 29.6 26.0 - 33.0 pg    MCHC 31.6 (L) 32.2 - 35.5 gm/dl    RDW-CV 13.2 11.5 - 14.5 %    RDW-SD 45.1 (H) 35.0 - 45.0 fL    Platelets 871 437 - 673 thou/mm3    MPV 10.2 9.4 - 12.4 fL    Seg Neutrophils 70.5 %    Lymphocytes 19.0 %    Monocytes 8.8 %    Eosinophils 0.7 %    Basophils 0.6 %    Immature Granulocytes 0.4 %    Segs Absolute 9.9 (H) 1.8 - 7.7 thou/mm3    Lymphocytes Absolute 2.7 1.0 - 4.8 thou/mm3    Monocytes Absolute 1.2 0.4 - 1.3 thou/mm3 Eosinophils Absolute 0.1 0.0 - 0.4 thou/mm3    Basophils Absolute 0.1 0.0 - 0.1 thou/mm3    Immature Grans (Abs) 0.06 0.00 - 0.07 thou/mm3    nRBC 0 /100 wbc   Comprehensive Metabolic Panel   Result Value Ref Range    Glucose 100 70 - 108 mg/dL    CREATININE 0.9 0.4 - 1.2 mg/dL    BUN 27 (H) 7 - 22 mg/dL    Sodium 141 135 - 145 meq/L    Potassium 4.1 3.5 - 5.2 meq/L    Chloride 110 98 - 111 meq/L    CO2 15 (L) 23 - 33 meq/L    Calcium 9.2 8.5 - 10.5 mg/dL    AST 21 5 - 40 U/L    Alkaline Phosphatase 85 38 - 126 U/L    Total Protein 7.4 6.1 - 8.0 g/dL    Albumin 3.7 3.5 - 5.1 g/dL    Total Bilirubin 0.8 0.3 - 1.2 mg/dL    ALT 9 (L) 11 - 66 U/L   Troponin   Result Value Ref Range    Troponin T < 0.010 ng/ml   Brain Natriuretic Peptide   Result Value Ref Range    Pro-.1 0.0 - 1800.0 pg/mL   Lactic Acid   Result Value Ref Range    Lactic Acid 2.4 (H) 0.5 - 2.0 mmol/L   Procalcitonin   Result Value Ref Range    Procalcitonin 0.10 (H) 0.01 - 0.09 ng/mL   CK   Result Value Ref Range    Total  (H) 30 - 135 U/L   Magnesium   Result Value Ref Range    Magnesium 2.4 1.6 - 2.4 mg/dL   Anion Gap   Result Value Ref Range    Anion Gap 16.0 8.0 - 16.0 meq/L   Glomerular Filtration Rate, Estimated   Result Value Ref Range    Est, Glom Filt Rate 58 (A) ml/min/1.73m2   Osmolality   Result Value Ref Range    Osmolality Calc 286.5 275.0 - 300.0 mOsmol/kg   Urine with Reflexed Micro   Result Value Ref Range    Glucose, Ur NEGATIVE NEGATIVE mg/dl    Bilirubin Urine NEGATIVE NEGATIVE    Ketones, Urine TRACE (A) NEGATIVE    Specific Gravity, Urine 1.018 1.002 - 1.030    Blood, Urine NEGATIVE NEGATIVE    pH, UA 6.0 5.0 - 9.0    Protein, UA 30 (A) NEGATIVE    Urobilinogen, Urine 1.0 0.0 - 1.0 eu/dl    Nitrite, Urine NEGATIVE NEGATIVE    Leukocyte Esterase, Urine SMALL (A) NEGATIVE    Color, UA YELLOW STRAW-YELLOW    Character, Urine CLEAR CLEAR-SL CLOUD    RBC, UA 0-2 0-2/hpf /hpf    WBC, UA 0-2 0-4/hpf /hpf    Epithelial Cells, UA 0-2 3-5/hpf /hpf    Bacteria, UA NONE SEEN FEW/NONE SEEN /hpf    Casts UA NONE SEEN NONE SEEN /lpf    Crystals, UA NONE SEEN NONE SEEN    Renal Epithelial, UA NONE SEEN NONE SEEN    Yeast, UA NONE SEEN NONE SEEN    CASTS 2 NONE SEEN NONE SEEN /lpf    MISCELLANEOUS 2 NONE SEEN    Lactic Acid   Result Value Ref Range    Lactic Acid 1.4 0.5 - 2.0 mmol/L   POCT glucose   Result Value Ref Range    Glucose 94 mg/dL   POCT Glucose   Result Value Ref Range    POC Glucose 94 70 - 108 mg/dl       RADIOLOGY REPORTS  CT HEAD WO CONTRAST   Final Result    No evidence of an acute process. No change from prior. **This report has been created using voice recognition software. It may contain minor errors which are inherent in voice recognition technology. **      Final report electronically signed by Dr. Bert Plummer on 2/27/2022 12:39 PM      CT CERVICAL SPINE WO CONTRAST   Final Result       1. No fracture. 2. Stable degenerative changes the cervical spine. **This report has been created using voice recognition software. It may contain minor errors which are inherent in voice recognition technology. **      Final report electronically signed by Dr. Bert Plummer on 2/27/2022 12:41 PM      XR PELVIS (1-2 VIEWS)   Final Result    No fracture. **This report has been created using voice recognition software. It may contain minor errors which are inherent in voice recognition technology. **      Final report electronically signed by Dr. Bert Plummer on 2/27/2022 12:27 PM      XR CHEST 1 VIEW   Final Result   Stable radiographic appearance of the chest. No evidence of an acute process. **This report has been created using voice recognition software. It may contain minor errors which are inherent in voice recognition technology. **      Final report electronically signed by Dr. Bert Plummer on 2/27/2022 12:35 PM          MEDICAL [id number] ([facility])     Differential Diagnosis:

## 2022-02-27 NOTE — H&P
History & Physical        Patient: Chauncey Flanagan  YOB: 1928    MRN: [id number]     Acct: [de-identified]    PCP: Chuckie Marino. SHAMA Garcia - CNP    Date of Admission: 2/27/2022    Date of Service: Pt seen/examined on 02/27/22  and Admitted to Observation with expected LOS less than two midnights due to medical therapy. Chief Complaint:  AMS    Assessment and plan: Altered mental status. Patient is oriented to only person and not oriented to place/time. unclear etiology. Multifactorial most likely. Worsening dementia, dehydration, suspected trauma/fall.   -Initial lactic acid was mildly elevated-2.4 and responded to initial interventions and resolved with a return interview  -Due to poor oral intake, will start normal saline 75 mL/h  -Monitor daily CBC BMP  -Palliative care consulted for goals of care/CODE STATUS since daughter states that the patient would stay DNR CCA. -Consulted PT, OT, . High likely patient will need ESF placement, but daughter wants to take care of her at home  suspected accidental fall episode, without injury . unwitnessed. .  Daughter she found the patient on the floor, however she did not see whether she the patient if fell down. no trauma signs seen, no external bleeding/wounds seen.  -Limited HPI and physical exam due to altered mental status and profound hearing loss  -CT head negative for acute findings such as hemorrhagic/midline shift  -We will monitor closely, fall precautions  Physical debility  -Patient looks fragile, thin, chronically ill looking, profound memory deficiency noted  Dementia. High likely vascular. Patient's daughter states that patient's mental status will 1 year change - worsening, and each time her mental status suddenly worsened. Currently patient is only oriented to person. Cannot recall important information about herself, cannot hold conversation. .  Per Daughter, patient time to time shows restlessness, memory Laterality Date    APPENDECTOMY      CARDIAC PACEMAKER PLACEMENT      CARDIAC PACEMAKER PLACEMENT         Medications Prior to Admission:      Prior to Admission medications    Medication Sig Start Date End Date Taking? Authorizing Provider   amLODIPine (NORVASC) 10 MG tablet Take 1 tablet by mouth daily 8/10/21   Anmol Cons, DO   metoprolol succinate (TOPROL XL) 50 MG extended release tablet Take 1 tablet by mouth daily 8/10/21   Anmol Cons, DO   hydrALAZINE (APRESOLINE) 25 MG tablet Take 1 tablet by mouth every 8 hours 8/9/21   Anmol Cons, DO       Allergies:  Pcn [penicillins]    Social History:      The patient currently lives     TOBACCO:   reports that she has quit smoking. She has never used smokeless tobacco.  ETOH:   reports previous alcohol use. Family History:       Reviewed in detail and negative for DM, CAD, Cancer, CVA. Positive as follows:    History reviewed. No pertinent family history. Diet:  No diet orders on file    REVIEW OF SYSTEMS:   All 13 review of symptoms are negative except as listed above in the HPI. PHYSICAL EXAM:    BP (!) 159/81   Pulse 90   Temp (S) 98.1 °F (36.7 °C) (Rectal)   Resp 17   Ht 5' 2\" (1.575 m)   Wt 130 lb (59 kg)   SpO2 99%   BMI 23.78 kg/m²     General appearance:  No apparent distress, appears stated age and cooperative. Thin looking. Chronically ill looking  HEENT:  Normal cephalic, atraumatic without obvious deformity. Pupils equal, round, and reactive to light. Extra ocular muscles intact. Conjunctivae/corneas clear. Neck: Supple, with full range of motion. No jugular venous distention. Trachea midline. Respiratory:  Normal respiratory effort. Clear to auscultation, bilaterally without Rales/Wheezes/Rhonchi. Cardiovascular:  Regular rate and rhythm with normal S1 ACCENTUATED AND NORMAL S2 without murmurs, rubs or gallops. Abdomen: Soft, non-tender, non-distended with normal bowel sounds.   Musculoskeletal:  No clubbing, cyanosis or edema bilaterally. Full range of motion without deformity. Skin: Skin color, texture, turgor normal for the age--last organ . no rashes or lesions. On left elbow there is a bruise  Neurologic:  Neurovascularly intact without any focal sensory/motor deficits. Cranial nerves: II-XII intact, grossly non-focal.  Psychiatric:  Alert, awake and oriented only to person,   Capillary Refill: Brisk,< 3 seconds   Peripheral Pulses: +2 palpable, equal bilaterally       Labs:     Recent Labs     02/27/22  1205   WBC 14.0*   HGB 12.8   HCT 40.5        Recent Labs     02/27/22  1205      K 4.1      CO2 15*   BUN 27*   CREATININE 0.9   CALCIUM 9.2     Recent Labs     02/27/22  1205   AST 21   ALT 9*   BILITOT 0.8   ALKPHOS 85     No results for input(s): INR in the last 72 hours. Recent Labs     02/27/22  1205   CKTOTAL 212*       Urinalysis:      Lab Results   Component Value Date    NITRU NEGATIVE 02/27/2022    WBCUA 0-2 02/27/2022    BACTERIA NONE SEEN 02/27/2022    RBCUA 0-2 02/27/2022    BLOODU NEGATIVE 02/27/2022    SPECGRAV 1.011 08/03/2021    GLUCOSEU NEGATIVE 02/27/2022       Intake & Output:  No intake/output data recorded. No intake/output data recorded. Radiology:     CXR: I have reviewed the CXR with the following interpretation:   EKG:  I have reviewed the EKG with the following interpretation:     CT HEAD WO CONTRAST   Final Result    No evidence of an acute process. No change from prior. **This report has been created using voice recognition software. It may contain minor errors which are inherent in voice recognition technology. **      Final report electronically signed by Dr. Omie Nageotte on 2/27/2022 12:39 PM      CT CERVICAL SPINE WO CONTRAST   Final Result       1. No fracture. 2. Stable degenerative changes the cervical spine. **This report has been created using voice recognition software.  It may contain minor errors which are inherent in voice recognition technology. **      Final report electronically signed by Dr. Powell Ganser on 2/27/2022 12:41 PM      XR PELVIS (1-2 VIEWS)   Final Result    No fracture. **This report has been created using voice recognition software. It may contain minor errors which are inherent in voice recognition technology. **      Final report electronically signed by Dr. Powell Ganser on 2/27/2022 12:27 PM      XR CHEST 1 VIEW   Final Result   Stable radiographic appearance of the chest. No evidence of an acute process. **This report has been created using voice recognition software. It may contain minor errors which are inherent in voice recognition technology. **      Final report electronically signed by Dr. Powell Ganser on 2/27/2022 12:35 PM           DVT prophylaxis: {dvt prophylaxis:79950    Code Status: Prior      PT/OT Eval Status: Consulted    Disposition:TBD    C/Jaya Coughlin 1106 Problems    Diagnosis Date Noted    Accident due to mechanical fall without injury [W19. XXXA] 02/27/2022                   Thank you Agapito Rodriguez. SHAMA Aguayo - ALVIN for the opportunity to be involved in this patient's care.     Electronically signed by Rachelle Jensen DO on 2/27/2022 at 4:44 PM

## 2022-02-27 NOTE — ED TRIAGE NOTES
Pt presents to the ED by EMS with c/c fall. EMS states that pt, with advanced dementia, was last seen sleeping on the couch at 0500. When family went to check on pt, pt was lying on the ground. Pt withdraws when arms are touched. Pt at baseline mentality per family. Vitals stable. EKG completed on arrival. Telemetry in place.

## 2022-02-27 NOTE — ED NOTES
Pt resting on cot with eyes closed. Warm blankets provided to pt. resp even and unlabored. Vitals stable. Call light in reach.  Family at [address], RN  02/27/22 9907

## 2022-02-27 NOTE — ED NOTES
ED to inpatient nurses report    Chief Complaint   Patient presents with    Fall    Dementia      Present to ED from home  LOC: alert to only name  Vital signs   Vitals:    02/27/22 1145 02/27/22 1240 02/27/22 1327 02/27/22 1432   BP:  116/70 (!) 174/74 (!) 159/81   Pulse:  87 94 93   Resp:  18  18   Temp: (S) 98.1 °F (36.7 °C)      TempSrc: Rectal      SpO2:  99%  99%   Weight:       Height:          Oxygen Baseline room air     Current needs required none   LDAs:   Peripheral IV 02/27/22 Left Forearm (Active)   Site Assessment Clean;Dry; Intact 02/27/22 1432   Line Status Normal saline locked 02/27/22 1432   Dressing Status Clean;Dry; Intact 02/27/22 1432     Mobility: Requires assistance * 2  Pending ED orders: none  Present condition: stable      Electronically signed by Greg Staton RN on 2/27/2022 at 3:28 PM     Greg Staton RN  02/27/22 1524

## 2022-02-28 NOTE — PROGRESS NOTES
Patient resting quietly in bed. No family present. Palliative care will attempt to return later as time permits.

## 2022-02-28 NOTE — CARE COORDINATION
DISCHARGE/PLANNING EVALUATION  2/28/22, 10:30 AM EST     Reason for Referral: \"ECF\"  Mental Status: Patient is alert but is very hard of hearing and blind  Decision Making: Patient makes decisions with daughter  Family/Social/Home Environment: Briefly spoke with patient, spoke with daughter Judith Daniels on the phone, assessment completed. Patient lives with Juan Manuel Ramirez in a one story home. She has been independent with showering and dressing. Judith Daniels does all the household chores and cooking, takes her to appointments. Judith Daniels stated that patient has become very weak. Current Services including food security, transportation and housekeeping: see above  Current Equipment: walker, cane, shower chair  Payment Source: [employer]  Concerns or Barriers to Discharge: Discussed with Judith Daniels possible ECF. Judith Daniels is hesitant about ECF as patient is blind and very hard of hear, does not want her to feel unsafe. However,, Judith Daniels is not sure she is able to care for her depending on how she is able to ambulate. SW will meet with Judith Daniels in afternoon to discuss plan and review therapy notes/recommendations. Post acute provider list with quality measures, geographic area and applicable managed care information provided. Questions regarding selection process answered: not at this time    Teach Back Method used with Judith Daniels, daughter regarding care plan and discharge planning. Patient and Judith Daniels verbalize understanding of the plan of care and contribute to goal setting. Patient goals, treatment preferences and discharge plan: Plan home with daughter and new HH vs ECF. Electronically signed by PAT Perez on 2/28/2022 at 10:30 AM     4:21 PM  Spoke with daughter Judith Daniels, discussed different options, home with [facility], home with Hospice, ECF for skilled, ECF for hospice. Gave her ECF, HH and private duty list. Will talk again tomorrow after she talks with hospice.

## 2022-02-28 NOTE — PROGRESS NOTES
[facility]           Progress Note    Patient Name:  Anastasia Garner  Medical Record Number:  [id number]  YOB: 1928    Date:  2/28/2022  Time:  2:46 PM  Completed By:  Walter Douglas RN    Reason for Palliative Care Evaluation:  Goals/code status    Current Issues:  []  Pain  []  Fatigue  []  Nausea  []  Anxiety  []  Depression  []  Shortness of Breath  []  Constipation  [x]  Appetite  []  Other:    Advance Directives  [x] [place] DNR Form  [] Living Will  [] Medical POA    Current Code Status:changed to DNR CC  [] Full Resuscitation  [x] DNR-Comfort Care-Arrest  [] DNR-Comfort Care  [] Limited   [] No CPR   [] No shock   [] No ET intubation/reintubation   [] No resuscitative medications   [] Other limitation:      ___70%  Ambulation reduced; Some disease; Can't do normal job or work; intake normal or reduced; can do full self care; LOC full  ___60%  Ambulation reduced; Significant disease; Can't do hobbies/housework; intake normal or reduced; occasional assist; LOC full/confusion  ___50%  Mainly sit/lie; Extensive disease; Can't do any work; Considerable assist; intake normal or reduced; LOC full/confusion  __x_40%  Mainly in bed; Extensive disease; Mainly assist; intake normal or reduced; LOC full/confusion   ___30%  Bed Bound; Extensive disease; Total care; intake reduced; LOC full/confusion  ___20%  Bed Bound; Extensive disease; Total care; intake minimal; Drowsy/coma  ___10%  Bed Bound; Extensive disease; Total care; Mouth care only; Drowsy/coma  ___0       Death    Family/Patient Discussion:  Patient resting in bed. Patient's daughter, Aisha Terry is now at the bedside. Palliative care introduced. Patient is Solomon and does not contribute to any conversation at this time although Deysirachell Mara does state that she can verbalize. Aisha Terry shares that the patient does not eat well and she remarks that the patient is looking better now.   Discussed that the patient is now hydrated and that she may decline again if she is not eating/drinking well. Discussed goals of care and Chelle Ask indicates that her goals for her mom are comfort only. Chelle Ask indicates that she is to the point where she would not want to have to return the patient to the hospital and she does not want any aggressive medical measures for the patient. Discussed resource of hospice and Chelle Ask would like to have the patient evaluated for hospice and if qualifies, she would be interested in this resource. Did discuss that patient needs to meet certain criteria to qualify and if she does not, then at least Chelle Ask would have the contact number of hospice and she could call them if the patient continues to decline. Discussed [place] DNR form and its use. Much emotional support provided. Plan/Follow-Up:  Updated primary RNMICHAEL. Updated Dr. Venkat Suresh and orders received for DNR CC and hospice consult. Hospice notified of the consult. [place] DNR form placed in chart [facility] for MD signature. Please call palliative care if further needs arise.     Chad Phillips, CYNTHIA  2/28/2022,  2:46 PM

## 2022-02-28 NOTE — PROGRESS NOTES
Matthe[address]overonica Ramirezmarni 60  INPATIENT OCCUPATIONAL THERAPY  [facility] SURG 8B  EVALUATION    Time:   Time In: 5757  Time Out: 1028  Timed Code Treatment Minutes: 23 Minutes  Minutes: 32    Date: 2022  Patient Name: Serafin Cerna,   Gender: female      MRN: [id number]  : 1928  (80 y.o.)  Referring Practitioner: Michael Nathan DO  Diagnosis: Dehydration  Additional Pertinent Hx: Per H&P: \"80 y.o. female who presented to [facility] with altered mental status. She is brought by EMS/daughter. Patient's mental status is altered, also she has profound hearing loss so HPI is mainly taken from daughter and EMR. Patient's daughter states that the patient is found at home, on the floor, without obvious trauma signs. Patient lives with a daughter who takes care of her, and patient's mental status worsened over the past year, but stepwise after each worsening patient ended up at hospital or in ED. the patient cannot recall important life/health information about herself. She is only oriented to person but not place and time. Currently she denies chest pain, tightness, heart palpitations, cough, sputum production, hemoptysis, abdominal pain, dysuria, fever, seizure, chills. CT head and neck is negative for acute findings. Chest x-ray negative for acute finding. Pelvis x-ray is negative for fracture. \"    Restrictions/Precautions:  Restrictions/Precautions: General Precautions,Fall Risk  Position Activity Restriction  Other position/activity restrictions: very [place], blind    Subjective  Chart Reviewed: [facility]  Patient assessed for rehabilitation services?: Yes  Family / Caregiver Present: No    Subjective: RN approved OT session.  Pt sitting up in recliner upon arrival in the nude, pt states \"it's wet\" pointing to gown thrown on EOB, and with blood noted on L knee and arm, appears pt pulled out IV prior to OT arrival. RN presenting to room and assisting with blood clean up. Increased time required throughout session due to pt VERY Craig and difficulty with communication. Pt unable to hold conversation or provide PLOF and no family present on evaluation. Pain:  Pain Assessment  Patient Currently in Pain: Denies    Vitals: Vitals not assessed per clinical judgement, see nursing flowsheet    Social/Functional History:  Lives With: Daughter  Type of Home: House  Home Layout: One level  Home Equipment: Rolling walker,Cane   Bathroom Equipment: Shower chair       ADL Assistance: Independent (Per SW note, pt ind with bathing and dressing)  Homemaking Assistance: Needs assistance  Homemaking Responsibilities: No (daughter Erinn Washington completes all homemaking tasks)  Ambulation Assistance: Needs assistance  Transfer Assistance: Needs assistance    Active : No  Patient's  Info: family provides transportation services     Additional Comments: Pt amb with walker in the home, Social hx obtained from EMR, pt is poor historian, very hard of hearing and has end stage dementia. Family helps assist with all tasks and care for pt. VISION:Impaired - per chart review pt with extremely poor vision    HEARING:  Pt extremely Craig - much increased time required for communication     COGNITION: Slow Processing, Decreased Recall, Decreased Insight, Impaired Memory, Decreased Problem Solving and Decreased Safety Awareness - pt with much difficulty following/understanding commands this date, however unable to discern if this is due to baseline dementia or significant Craig and poor vision    RANGE OF MOTION:  Bilateral Upper Extremity:  not formally tested due to difficulty with communication, however appears WFL    STRENGTH:  Bilateral Upper Extremity:  unable to formally assess due to cognition and hearing, anticipate 3+/5 grossly    SENSATION:   Unable to assess due to cognition and Craig    ADL:   Upper Extremity Dressing: Maximum Assistance.   to thread BUEs into hospital gown - pt keeping elbows flexed and arms near chest throughout most of session  Lower Extremity Dressing: Dependent. to don bilateral socks and don pull-up brief    BALANCE:  Sitting Balance:  Stand By Assistance, with increased time for completion. Standing Balance: Contact Guard Assistance. with 2UE support onRW    BED MOBILITY:  Sit to Supine: Minimal Assistance, with head of bed flat, with verbal cues  with increased time for completion due to difficult communication  Scooting: Maximum Assistance, with head of bed flat for repositioning of hips and LEs in bed, and dependent to boost up using hercules sheet    TRANSFERS:  Sit to Stand:  [employer], Minimal Assistance, to/from chair with arms. with max verbal cues to initiate due to difficult communication  Stand to Sit: [employer]. FUNCTIONAL MOBILITY:  Assistive Device: Rolling Walker  Assist Level:  Minimal Assistance. Distance: recliner to EOB  With pt requiring min A to safely maneuver RW as pt attempting to place off to side. Also max cues for proper alignment. No LOB. Increased time required. Activity Tolerance:  Patient tolerance of  treatment: fair. Activity limited by cognition and hearing loss. Assessment:  Assessment: 81yo presenting s/p unwitnessed fall with baseline dx of dementia and worsening cognition over the past year now experiencing the following performance deficits. Patient required increased assistance for safe completion of all self care, transfer, and functional mobility tasks, including max A for self completion and min A for functional mobility. Patient would benefit from continued skilled OT services throughout admission and following discharge to maximize and maintain safety and indep.   Performance deficits / Impairments: Decreased functional mobility ,Decreased cognition,Decreased safe awareness,Decreased ADL status  Prognosis: Fair  REQUIRES OT FOLLOW UP: Yes    Treatment Initiated: Treatment and education initiated within context of evaluation. Evaluation time included review of current medical information, gathering information related to past medical, social and functional history, completion of standardized testing, formal and informal observation of tasks, assessment of data and development of plan of care and goals. Treatment time included skilled education and facilitation of tasks to increase safety and independence with ADL's for improved functional independence and quality of life. Discharge Recommendations:  Continue to assess pending progress,Patient would benefit from continued therapy after discharge,24 hour supervision or assist,Subacute/Skilled Nursing Facility    Patient Education:  OT Education: OT Torres Guerin of Care  Barriers to Learning: Cognition, vision, and hearing    Equipment Recommendations:  Equipment Needed:  (continue to monitor pending discharge location)    Plan:  Times per week: 5x  Current Treatment Recommendations: Strengthening,Balance Training,Functional Mobility Training,Endurance Training,Self-Care / ADL. See long-term goal time frame for expected duration of plan of care. If no long-term goals established, a short length of stay is anticipated. Goals:  Patient goals : Unable to state  Short term goals  Time Frame for Short term goals: By discharge  Short term goal 1: Patient to complete BADL task with no > min A to increase indep in dressing tasks. Short term goal 2: Patient to complete dynamic standing task x5 minutes with unilateral UE release and SBA to increase indep in sinkside grooming tasks. Short term goal 3: Patient to complete BUE AROM to light resistive HEP with min cues for technique to increase strength needed for functional transfer and mobility tasks. Short term goal 4: Patient to increase activity tolerance to/from BR and HH distances with SBA to increase indep in accessing home environment.     Following session, patient left in safe position with all fall risk precautions in place.

## 2022-02-28 NOTE — PROGRESS NOTES
Hospice referral completed with Ivory Mcdonald in [facility] room. Patient [place] and unable to particiapte in conversation, pleasantly confused with frequently pulling at IV line. Hospice concepts, philosophies, and services explained. Discussed patient overall decline in last year and progression last month. Ivory Tamara told nurse that only way that patient able to go home is if able to walk. Let her know that hospice comes and goes without providing around the clock care. At this time Ivory Mcdonald is overwhelmed with patient care needs and decisions that will need to be done. Did discuss with her that, although having hospice consult does not mean that hospice will admit. Talked with her about criteria for admission to service and hospice medical director Is the one that makes this choice. We did go over hospice at home, [place] at home, and therapy at [facility]. Ivory Mcdonald ask that nurse discuss case with Dr. Erwin Newberry- to see if patient meeting for hospice. She will think about things tonight to see where and what services will want. Will review case with Dr. Erwin Newberry later in day and update daughter. Updated primary nurse Trevor Estrada of hospice referral being completed.

## 2022-02-28 NOTE — PROGRESS NOTES
[address] [place] [address]  INPATIENT PHYSICAL THERAPY  EVALUATION  [facility] MED SURG 8B - 8B-32/032-A    Time In: 0852  Time Out: 0915  Timed Code Treatment Minutes: 45 Minutes  Minutes: 51          Date: 2022  Patient Name: Timothy Rosales,  Gender:  female        MRN: [id number]  : 1928  (80 y.o.)      Referring Practitioner: Chang Nathan DO  Diagnosis: dehydration  Additional Pertinent Hx: Per EMR \"80 y.o. female who presented to [address] [place] [address] with altered mental status. She is brought by EMS/daughter. Patient's mental status is altered, also she has profound hearing loss so HPI is mainly taken from daughter and EMR. Patient's daughter states that the patient is found at home, on the floor, without obvious trauma signs. Patient lives with a daughter who takes care of her, and patient's mental status worsened over the past year, but stepwise after each worsening patient ended up at hospital or in ED. the patient cannot recall important life/health information about herself. She is only oriented to person but not place and time. Currently she denies chest pain, tightness, heart palpitations, cough, sputum production, hemoptysis, abdominal pain, dysuria, fever, seizure, chills. In ED, lactic acid was found mildly elevated and started IV fluids bolus and repeat labs shows resolving lactic acidosis. CT head and neck is negative for acute findings. Chest x-ray negative for acute finding. Pelvis x-ray is negative for fracture. \"     Restrictions/Precautions:  Restrictions/Precautions: General Precautions,Fall Risk  Position Activity Restriction  Other position/activity restrictions: very [place], blind    Subjective:  Chart Reviewed: Yes  Patient assessed for rehabilitation services?: Yes  Family / Caregiver Present: No  Subjective: Ok to see pt per nursing.  Pt in bed when PT arrived, soiled bed sheets, required increased time for clean up and anahi care during session as well as pt being [facility] and confused. Pt agreeable to sit in bedside chair for breakfast.    General:  Overall Orientation Status: Impaired  Orientation Level: Oriented to person,Oriented to place  Follows Commands: Impaired    Vision: Impaired  Vision Exceptions: Legally blind (per , pt is blind)    Hearing: Exceptions to [facility]  Hearing Exceptions: Hard of hearing/hearing concerns         Pain: denies    Vitals: Vitals not assessed per clinical judgement, see nursing flowsheet    Social/Functional History:    Lives With: Daughter  Type of Home: House  Home Layout: One level  Home Equipment: Rolling walker,Cane     Bathroom Equipment: Shower chair       ADL Assistance: Independent (Per  note, pt ind with bathing and dressing)  Homemaking Assistance: Needs assistance  Homemaking Responsibilities: No (daughter Brain Jovel completes all homemaking tasks)  Ambulation Assistance: Needs assistance  Transfer Assistance: Needs assistance    Active : No     Additional Comments: Pt amb with walker in the home, Social hx obtained from EMR, pt is poor historian, very hard of hearing and has end stage dementia. Family helps assist with all tasks and care for pt. OBJECTIVE:  Range of Motion:  Bilateral Lower Extremity: WNL    Strength:  Bilateral Lower Extremity: Impaired - grossly deconditioned, did not assess MMT due to cognition and Ruby    Balance:  Static Sitting Balance:  Supervision sitting on EOB and in chair  Dynamic Sitting Balance: Stand By Assistance, reaching for items  Static Standing Balance: Contact Guard Assistance, Minimal Assistance, X 1, with cues for safety, with verbal cues , with increased time for completion, helped assist with anahi care, changing brief as pt incontinent x3, multiple times for standing.      Bed Mobility:  Rolling to Left: Contact Guard Assistance, X 1, with head of bed raised, with rail, with verbal cues , with increased time for completion   Supine to Sit: Contact Guard Assistance, X 1, with head of bed raised, with rail, with verbal cues , with increased time for completion  Cues for technique of completion  Transfers:  Sit to Stand: Contact Guard Assistance, Minimal Assistance, X 1, with increased time for completion, cues for hand placement, with verbal cues  Stand to Sit:Minimal Assistance, X 1, with increased time for completion, cues for hand placement, with verbal cues  RW for support, multiple transfers completed due to incontinent of bladder x3, requiring assist with anahi care. Pt required max A x2 for scooting self back in chair at end of session. Ambulation:  Contact Guard Assistance, Minimal Assistance, X 1, with cues for safety, with verbal cues , with increased time for completion  Distance: 3 feet, 2 feet  Surface: Level Tile  Device:Rolling Walker  Gait Deviations: Forward Flexed Posture, Slow Ximena, Decreased Step Length Bilaterally, Decreased Gait Speed, Decreased Heel Strike Bilaterally, Unsteady Gait and Decreased Terminal Knee Extension  Cues for overall safety with fair demo    Functional Outcome Measures: Completed  AM-PAC Inpatient Mobility Raw Score : 16  AM-PAC Inpatient T-Scale Score : 40.78    ASSESSMENT:  Activity Tolerance:  Patient tolerance of  treatment: fair. nilsa AGUILERA      Treatment Initiated: Treatment and education initiated within context of evaluation. Evaluation time included review of current medical information, gathering information related to past medical, social and functional history, completion of standardized testing, formal and informal observation of tasks, assessment of data and development of plan of care and goals. Treatment time included skilled education and facilitation of tasks to increase safety and independence with functional mobility for improved independence and quality of life. Assessment:   Body structures, Functions, Activity limitations: Decreased functional mobility ,Decreased balance,Decreased posture,Decreased strength,Decreased safe awareness,Decreased cognition,Decreased endurance  Assessment: Pt requires 1 person assist for mobility tasks and ADL tasks this date. Pt cont to require skilled PT service to progress with strength, balance, and endurance to progress with overall mobility to progress towards PLOF. Prognosis: Fair    REQUIRES PT FOLLOW UP: Yes    Discharge Recommendations:  Discharge Recommendations: Continue to assess pending progress,Subacute/Skilled Nursing Facility,ECF with PT,Patient would benefit from continued therapy after discharge    Patient Education:  PT Education: General Safety,Gait Training,PT Role,Plan of Care,Functional Mobility Training,Transfer Training,Equipment    Equipment Recommendations:  Equipment Needed: No    Plan:  Times per week: 5x GM  Current Treatment Recommendations: Strengthening,Home Exercise Program,Neuromuscular Re-education,Safety Education & Training,Balance Training,Endurance Training,Patient/Caregiver Education & Training,Functional Mobility Training,Equipment Evaluation, Education, & procurement,Transfer Training,Gait Training,Stair training    Goals:  Patient goals : did not state  Short term goals  Time Frame for Short term goals: by discharge  Short term goal 1: Pt will amb for >20 feet with RW for support with SBA for safety to progress with mobility. Short term goal 2: Pt will demo S for transfers with RW for support to progress with overall mobility. Short term goal 3: Pt will demo IND with bed mobility tasks with good technique to progress with overall mobility. Long term goals  Time Frame for Long term goals : NA due to short ELOS    Following session, patient left in safe position with all fall risk precautions in place. Pt in bedside chair following session, all needs and call light in reach with chair alarm on.

## 2022-02-28 NOTE — PROGRESS NOTES
Hospitalist Progress Note    Patient: Benito Hurst      Unit/Bed:8B-32/032-A    YOB: 1928    MRN: [id number]       Acct: [de-identified]     PCP: Meño Marie Fremont Loss, APRN - CNP    Date of Admission: 2/27/2022    Assessment/Plan:    Altered mental status-improved. Patient is oriented to only person and not oriented to place/time. unclear etiology. Multifactorial most likely. Worsening dementia, dehydration, suspected trauma/fall.   -Initial lactic acid was mildly elevated-2.4 and responded to initial interventions and resolved with a return interview  -Due to poor oral intake, will start normal saline 75 mL/h  -Monitor daily CBC BMP  -Palliative care consulted for goals of care/CODE STATUS since daughter states that the patient would stay DNR CCA. -Consulted PT, OT, . High likely patient will need ESF placement, but daughter wants to take care of her at home  2/28  -Patient's family requested palliative care consult and hospice team consult for possible hospice options. Hospice team consulted and information given, but patient's family educated about different types of CODE STATUS and possible hospice options. Patient's family decided to change CODE STATUS to DNR CC  suspected accidental fall episode, without injury . unwitnessed. .  Daughter she found the patient on the floor, however she did not see whether she the patient if fell down. no trauma signs seen, no external bleeding/wounds seen.  -Limited HPI and physical exam due to altered mental status and profound hearing loss  -CT head negative for acute findings such as hemorrhagic/midline shift  -We will monitor closely, fall precautions  Physical debility  -Patient looks fragile, thin, chronically ill looking, profound memory deficiency noted  Dementia. High likely vascular. Patient's daughter states that patient's mental status will 1 year change - worsening, and each time her mental status suddenly worsened.   Currently patient is only oriented to person. Cannot recall important information about herself, cannot hold conversation. .  Per Daughter, patient time to time shows restlessness, memory issues  Dehydration. Due to poor oral intake, resolving, lactic was elevated initially and trended down to normal range after initial IV fluids.  -Patient's daughter denies any swallowing/choking issues/episodes occurred for the patient  -Patient is on IV fluids normal saline 75 mL/h  -Diet liquid and will consult speech pathologist for evaluation  Hypertension. Resumed home medications-amlodipine and Toprol.   -Hydralazine as needed 5 mg IV for hypertensive urgency  CHF/HFpEF/EF-60% G1 DD  -No signs of volume overload seen  -Toprol resumed  CKD II  Noted on the lab, eGFR-58  Mild leukocytosis  WBC -14, monitor, no fever/chills/dizziness reported, pro [place] is negative  Hearing loss. Patient has profound hearing difficulty  Vision difficulty due to history of macular degeneration      Expected discharge date:  tbd    Disposition:    [] Home       [] TCU       [] Rehab       [] Psych       [] SNF       [] [place]       [] Other-tbd    Chief Complaint: Altered mental status    Hospital Course: 80 y.o. female who presented to [facility] with altered mental status. She is brought by EMS/daughter. Patient's mental status is altered, also she has profound hearing loss so HPI is mainly taken from daughter and EMR. Patient's daughter states that the patient is found at home, on the floor, without obvious trauma signs. Patient lives with a daughter who takes care of her, and patient's mental status worsened over the past year, but stepwise after each worsening patient ended up at hospital or in ED. the patient cannot recall important life/health information about herself. She is only oriented to person but not place and time.   Currently she denies chest pain, tightness, heart palpitations, cough, sputum production, hemoptysis, abdominal pain, dysuria, fever, seizure, chills. In ED, lactic acid was found mildly elevated and started IV fluids bolus and repeat labs shows resolving lactic acidosis. CT head and neck is negative for acute findings. Chest x-ray negative for acute finding. Pelvis x-ray is negative for fracture. Subjective (past 24 hours): Patient seen at bedside, she is alert awake oriented to the person not to place and time. She has no new complaints. No overnight events reported. Palliative care consulted for goals care and CODE STATUS. Family decided to check hospice options. Hospice team consulted  Monitoring      ROS (12 point review of systems completed. Pertinent positives noted. Otherwise ROS is negative). Medications:  Reviewed    Infusion Medications    lactated ringers 50 mL/hr at 02/28/22 7930    sodium chloride       Scheduled Medications    sodium chloride flush  5-40 mL IntraVENous 2 times per day    enoxaparin  30 mg SubCUTAneous Daily    metoprolol succinate  50 mg Oral Daily    amLODIPine  5 mg Oral Daily     PRN Meds: sodium chloride flush, sodium chloride, ondansetron **OR** ondansetron, polyethylene glycol, acetaminophen **OR** acetaminophen, hydrALAZINE      Intake/Output Summary (Last 24 hours) at 2/28/2022 1803  Last data filed at 2/28/2022 2051  Gross per 24 hour   Intake 700 ml   Output 200 ml   Net 500 ml       Diet:  ADULT DIET; Full Liquid    Exam:  /60   Pulse 83   Temp 97.6 °F (36.4 °C) (Oral)   Resp 16   Ht 5' 2\" (1.575 m)   Wt 130 lb (59 kg)   SpO2 97%   BMI 23.78 kg/m²     General appearance:  No apparent distress, appears stated age and cooperative. Thin looking. Chronically ill looking  HEENT:  Normal cephalic, atraumatic without obvious deformity. Pupils equal, round, and reactive to light. Extra ocular muscles intact. Conjunctivae/corneas clear. Neck: Supple, with full range of motion. No jugular venous distention.  Trachea midline. Respiratory:  Normal respiratory effort. Clear to auscultation, bilaterally without Rales/Wheezes/Rhonchi. Cardiovascular:  Regular rate and rhythm with normal S1 ACCENTUATED AND NORMAL S2 without murmurs, rubs or gallops. Abdomen: Soft, non-tender, non-distended with normal bowel sounds. Musculoskeletal:  No clubbing, cyanosis or edema bilaterally. Full range of motion without deformity. Skin: Skin color, texture, turgor normal for the age--last organ . no rashes or lesions. On left elbow there is a bruise  Neurologic:  Neurovascularly intact without any focal sensory/motor deficits. Cranial nerves: II-XII intact, grossly non-focal.  Psychiatric:  Alert, awake and oriented only to person,   Capillary Refill: Brisk,< 3 seconds     Labs:   Recent Labs     02/27/22  1205 02/28/22  0714   WBC 14.0* 11.8*   HGB 12.8 11.9*   HCT 40.5 38.3    292     Recent Labs     02/27/22  1205 02/28/22  0714    143   K 4.1 3.7    113*   CO2 15* 16*   BUN 27* 14   CREATININE 0.9 0.6   CALCIUM 9.2 8.7     Recent Labs     02/27/22  1205 02/28/22  0714   AST 21 24   ALT 9* 9*   BILITOT 0.8 1.0   ALKPHOS 85 82     No results for input(s): INR in the last 72 hours. Recent Labs     02/27/22  1205   CKTOTAL 212*       Microbiology:      Urinalysis:      Lab Results   Component Value Date    NITRU NEGATIVE 02/27/2022    WBCUA 0-2 02/27/2022    BACTERIA NONE SEEN 02/27/2022    RBCUA 0-2 02/27/2022    BLOODU NEGATIVE 02/27/2022    SPECGRAV 1.011 08/03/2021    GLUCOSEU NEGATIVE 02/27/2022       Radiology:  CT HEAD WO CONTRAST   Final Result    No evidence of an acute process. No change from prior. **This report has been created using voice recognition software. It may contain minor errors which are inherent in voice recognition technology. **      Final report electronically signed by Dr. Jessica Urbano on 2/27/2022 12:39 PM      CT CERVICAL SPINE WO CONTRAST   Final Result       1. No fracture.    2. Stable degenerative changes the cervical spine. **This report has been created using voice recognition software. It may contain minor errors which are inherent in voice recognition technology. **      Final report electronically signed by Dr. Chico De La Rosa on 2/27/2022 12:41 PM      XR PELVIS (1-2 VIEWS)   Final Result    No fracture. **This report has been created using voice recognition software. It may contain minor errors which are inherent in voice recognition technology. **      Final report electronically signed by Dr. Chico De La Rosa on 2/27/2022 12:27 PM      XR CHEST 1 VIEW   Final Result   Stable radiographic appearance of the chest. No evidence of an acute process. **This report has been created using voice recognition software. It may contain minor errors which are inherent in voice recognition technology. **      Final report electronically signed by Dr. Chico De La Rosa on 2/27/2022 12:35 PM          DVT prophylaxis: [x] Lovenox                                 [] SCDs                                 [] SQ Heparin                                 [] Encourage ambulation           [] Already on Anticoagulation     Code Status: DNR-CC    PT/OT Eval Status:     Tele:   [] yes             [x] no    Electronically signed by Colin Garduno DO on 2/28/2022 at 6:03 PM

## 2022-02-28 NOTE — CARE COORDINATION
2/28/22, 10:49 AM EST  DISCHARGE PLANNING EVALUATION:    Anastasia Garner       Admitted: 2/27/2022/ [place] 1762 day: 0   Location: [facility]/032-A Reason for admit: Dehydration [E86.0]  Confusion [R41.0]  Unable to ambulate [R26.2]  Fall from bed, initial encounter [W06. XXXA]  Accident due to mechanical fall without injury, initial encounter [W19. XXXA]   PMH:  has a past medical history of Dementia ([facility] [address] and Hypertension. Procedure: none  Barriers to Discharge:  Presented post fall at home. PT/OT. IVF. 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 Transportation/Food Security/Housekeeping Addressed:  No issues identified.

## 2022-02-28 NOTE — PLAN OF CARE
Problem: Sleep Pattern Disturbance:  Goal: Appears well-rested  Description: Appears well-rested  Outcome: Met This Shift     Problem: Sleep Pattern Disturbance:  Goal: Appears well-rested  Description: Appears well-rested  Outcome: Met This Shift     Problem: Falls - Risk of:  Goal: Absence of physical injury  Description: Absence of physical injury  Outcome: Ongoing     Problem: Skin Integrity:  Goal: Will show no infection signs and symptoms  Description: Will show no infection signs and symptoms  Outcome: Ongoing  Goal: Absence of new skin breakdown  Description: Absence of new skin breakdown  Outcome: Ongoing     Problem: Confusion - Acute:  Goal: Absence of continued neurological deterioration signs and symptoms  Description: Absence of continued neurological deterioration signs and symptoms  Outcome: Ongoing  Goal: Mental status will be restored to baseline  Description: Mental status will be restored to baseline  Outcome: Ongoing     Problem: Discharge Planning:  Goal: Ability to perform activities of daily living will improve  Description: Ability to perform activities of daily living will improve  Outcome: Ongoing     Problem: Mood - Altered:  Goal: Mood stable  Description: Mood stable  Outcome: Ongoing     Problem: DISCHARGE BARRIERS  Goal: Patient's continuum of care needs are met  2/28/2022 1145 by Donna Flanagan RN  Outcome: Ongoing  2/28/2022 1037 by PAT Fraser  Outcome: Ongoing

## 2022-02-28 NOTE — PROGRESS NOTES
Patient sitting up in the chair with eyes closed. No family at the bedside. [id number]  Phone call made to patient's daughter, Sneha Berry and she will be arriving to the hospital later this morning. Palliative care will meet with her at that time.

## 2022-03-01 PROBLEM — E44.0 MODERATE MALNUTRITION (HCC): Status: ACTIVE | Noted: 2022-01-01

## 2022-03-01 NOTE — PROGRESS NOTES
[address] [place] [address] 8B  Occupational Therapy  Daily Note  Time:   Time In: 1010  Time Out: 1050  Timed Code Treatment Minutes: 40 Minutes  Minutes: 40          Date: 3/1/2022  Patient Name: Timothy Rosales,   Gender: female      Room: -32/032-A  MRN: [id number]  : 1928  (80 y.o.)  Referring Practitioner: Chang Nathan DO  Diagnosis: Dehydration  Additional Pertinent Hx: Per H&P: \"80 y.o. female who presented to [facility] with altered mental status. She is brought by EMS/daughter. Patient's mental status is altered, also she has profound hearing loss so HPI is mainly taken from daughter and EMR. Patient's daughter states that the patient is found at home, on the floor, without obvious trauma signs. Patient lives with a daughter who takes care of her, and patient's mental status worsened over the past year, but stepwise after each worsening patient ended up at hospital or in ED. the patient cannot recall important life/health information about herself. She is only oriented to person but not place and time. Currently she denies chest pain, tightness, heart palpitations, cough, sputum production, hemoptysis, abdominal pain, dysuria, fever, seizure, chills. CT head and neck is negative for acute findings. Chest x-ray negative for acute finding. Pelvis x-ray is negative for fracture. \"    Restrictions/Precautions:  Restrictions/Precautions: General Precautions,Fall Risk  Position Activity Restriction  Other position/activity restrictions: very [place], macular degeneration     SUBJECTIVE: Pleasant. Pt was motivated to get up out of bed. PAIN: None indicated. Vitals: Pt C/O feeling very tired after she had finished using the bathroom. She was showing 98% O2 saturation and 96 heart rate after having returned from the bathroom and sat in a chair.     COGNITION: Impaired Memory, Decreased Problem Solving and Decreased Safety Awareness    ADL:   Feeding: Minimal Assistance. assist of family to feed her but she was able to drink from a cup and also held onto a bowl while she had help eating from it  Grooming: with set-up. wiping her hands  Bathing: Moderate Assistance. help provided for washing and drying off her feet and calves after she had urine on them  Lower Extremity Dressing: Moderate Assistance.  don/doffing slipper socks and to don her Depends- she helped to pull them up after having stood  Toileting: Moderate Assistance. Pt wiped herself anteriorly after having voided but had to have help with wiping her bottom after having had a BM  Toilet Transfer: [address] to/from the standard toilet seat with cues for use of the grabbar. BALANCE:  Sitting Balance:  Stand By Assistance. having help with lower body ADLs  Standing Balance: Contact Guard Assistance. finishing her self care in the bathroom    BED MOBILITY:  Supine to Sit: Stand By Assistance, with verbal cues     Scooting: Stand By Assistance, with rail      TRANSFERS:  Sit to Stand:  Stand By Assistance. from the edge of bed   Stand to Sit: Stand By Assistance. to the straight chair    FUNCTIONAL MOBILITY:  Assistive Device: Rolling Walker  Assist Level:  Contact Guard Assistance. Distance: To and from bathroom  Pt has a slow pace and no LOB. She did bump into objects with the walker so she had help with steering around obstacles. ADDITIONAL ACTIVITIES:  Pt was very tired after having used the bathroom. Her daughter and granddaughter had arrived and asked how she was doing. Discussed her activity this morning during the session and pt's need to rest from having used the restroom. Family then assisted pt to get into the recliner chair and to change her hospital gown. ASSESSMENT:     Activity Tolerance:  Patient tolerance of  treatment: fair. Pt was very tired after having used the bathroom. She stood for 2 trials of 20 seconds and 40 second duration.   She needed a sitting rest break before she could help with the next activity. She remained sitting in a recliner chair with chair alarm activated at the end of session. Family present. Discharge Recommendations: 24 hour assistance or supervision  Equipment Recommendations: Equipment Needed:  (continue to monitor pending discharge location)  Plan: Times per week: 5x  Current Treatment Recommendations: Oval Brisk Mobility Training,Endurance Training,Self-Care / ADL    Patient Education  Patient Education: Family Education: difference between OT and PT    Goals  Short term goals  Time Frame for Short term goals: By discharge  Short term goal 1: Patient to complete BADL task with no > min A to increase indep in dressing tasks. Short term goal 2: Patient to complete dynamic standing task x5 minutes with unilateral UE release and SBA to increase indep in sinkside grooming tasks. Short term goal 3: Patient to complete BUE AROM to light resistive HEP with min cues for technique to increase strength needed for functional transfer and mobility tasks. Short term goal 4: Patient to increase activity tolerance to/from BR and HH distances with SBA to increase indep in accessing home environment. Following session, patient left in safe position with all fall risk precautions in place.

## 2022-03-01 NOTE — PROGRESS NOTES
[address] [address]  PHYSICAL THERAPY MISSED TREATMENT NOTE  [facility] 8B    Date: 3/1/2022  Patient Name: Dino Bray        MRN: [id number]   : 1928  (80 y.o.)  Gender: female   Referring Practitioner: Sharif Nathan DO  Diagnosis: dehydration         REASON FOR MISSED TREATMENT:  Missed treat. First attempt pt working with OT staff. Second attempt pt asleep in bed with daughter present. Daughter able to wake pt however pt stating she does not want to do therapy or get out of bed. Daughter did provide some encouragement but noted she also did not want to force her mom to do anything she didn't want to do. Offered support and noted pt was able to get up and walk to restroom with OT earlier. Daughter appreciative of therapy checking back. Will try again on next available date.

## 2022-03-01 NOTE — PLAN OF CARE
Nutrition Problem #1: Moderate malnutrition,In context of chronic illness  Intervention: Food and/or Nutrient Delivery: Start Oral Nutrition Supplement (diet as per SLP/ Doctor)  Nutritional Goals: Patient will consume 75% or more of meals during LOS   Problem: Nutrition  Goal: Optimal nutrition therapy  Outcome: Ongoing

## 2022-03-01 NOTE — PROGRESS NOTES
Comprehensive Nutrition Assessment    Type and Reason for Visit:  Initial,Positive Nutrition Screen (weight loss, poor appetite)    Nutrition Recommendations/Plan:   Diet progression as per Speech Therapy/ Doctor  ONS initiated: Magic Cup TID  Recommend weigh patient as able  Consider MVI    Nutrition Assessment:    Pt. moderately malnourished AEB criteria as listed below. At risk for further nutrition compromise r/t admit with dehydration, altered mental status and underlying medical condition (dementia, HTN). Malnutrition Assessment:  Malnutrition Status: Moderate malnutrition    Context:  Chronic Illness     Findings of the 6 clinical characteristics of malnutrition:  Energy Intake:  7 - 75% or less estimated energy requirements for 1 month or longer  Weight Loss:  Unable to assess (estimated weight on admission, family reports weight loss)     Body Fat Loss:  Unable to assess     Muscle Mass Loss:  1 - Mild muscle mass loss Temples (temporalis),Clavicles (pectoralis & deltoids)  Fluid Accumulation:  No significant fluid accumulation     Strength:  Not Performed    Estimated Daily Nutrient Needs:  Energy (kcal):  1718-5923 kcals (30-35); Weight Used for Energy Requirements:  Ideal (50kgm)     Protein (g):  60-75 grams (1.2-1.5);  Weight Used for Protein Requirements:  Ideal (50kgm)          Nutrition Related Findings:     Patient seen earlier today sitting in chair, granddaughter present, intake of few bites of pudding and bites of cream of wheat and 100% coffee for breakfast today; reports typically would eat cereal for breakfast, cookies later in day then dinner, reports decreased appetite over the past 6 months with intake half of what she had been typically eating, dislikes traditional ONS, agreeable to trial Magic Cup as she enjoys ice cream, reports swallowing without difficulty and does best with soft foods to aid in chewing, note SLP consult; BM x 1 on 2/28; Glucose 94; medication includes lactated ringers    Wounds:  Deep Tissue Injury (buttocks)       Current Nutrition Therapies:    ADULT DIET; Full Liquid  ADULT ORAL NUTRITION SUPPLEMENT; Breakfast, Lunch, Dinner; Frozen Oral Supplement    Anthropometric Measures:  · Height: 5' 2\" (157.5 cm)  · Current Body Weight: 130 lb (59 kg)   · Admission Body Weight: 130 lb (59 kg) (estimated 2/27; non pitting edema)    · Usual Body Weight:  (per EMR: 8/7/21 143# 3.2oz; family reports usual weight ~140# with weight loss over past few months)     · Ideal Body Weight: 110 lbs;   · BMI: 23.8  · BMI Categories: Normal Weight (BMI 22.0 to 24.9) age over 72       Nutrition Diagnosis:   · Moderate malnutrition,In context of chronic illness related to inadequate protein-energy intake as evidenced by poor intake prior to admission,mild muscle loss      Nutrition Interventions:   Food and/or Nutrient Delivery:  Start Oral Nutrition Supplement (diet as per SLP/ Doctor)  Nutrition Education/Counseling:  Education initiated (encouraged intake at best effort, use of ONS)   Coordination of Nutrition Care:  Continue to monitor while inpatient    Goals:  Patient will consume 75% or more of meals during LOS       Nutrition Monitoring and Evaluation:   Behavioral-Environmental Outcomes:  None Identified   Food/Nutrient Intake Outcomes:  Diet Advancement/Tolerance,Food and Nutrient Intake,Supplement Intake  Physical Signs/Symptoms Outcomes:  Biochemical Data,Fluid Status or Edema,Meal Time Behavior,Nutrition Focused Physical Findings,Skin,Weight,GI Status,Chewing or Swallowing     Discharge Planning:     Too soon to determine     Electronically signed by Angeli Plata RD, LD on 3/1/22 at 1:59 PM EST    Contact: [phone]

## 2022-03-01 NOTE — PROGRESS NOTES
Hospitalist Progress Note    Patient: Ton Perez    YOB: 1928  Unit/Bed:8B-32/032-A  MRN: [id number]    Acct: [de-identified]   PCP: Oscar Arnold. SHAMA Spencer - CNP    Date of Admission: 2/27/2022      Assessment/Plan:   Acute Metabolic Encephalopathy: suspected secondary to volume depletion and fall on history of known dementia. Ruled out any electrolyte/metabolic and infectious etiology (normal UA no infiltrate CXR, afebrile) on initial workup. CT head no bleed or other major abnormality. TSH and B12 wnl.   o Back to baseline. Improved with IV hydration  o Delirium precautions   Fall, PTA: fall at home and unable to get up without support. Unclear circumstance - unwitnessed. CT head and C spine no fracture or bleed. o Dtr concerned cannot care for patient at home any longer  o No further workup at this time, no need for tele or echo (goals of care CC)  o See below physical deconditioning   Mild Leukocytosis: without fever and procal <0.25, suspect reactive to dehydration. Improving, no need to trend at this time.  Dementia, progressive: suspected vascular based on chronic microvascular disease in brain. Patient is at her current baseline. Suspect component of visual and hearing impairment may be contributing.  Compensated HFpEF   HTN: continue home meds    Physical Deconditioning: PT and OT - Looking into SNF - SW on case.  Goals of Care: palliative care and hospice following. Code status changed to DNR CC by Daughter [facility]) however wanting to get into some rehab first to see if can safely return home. Expected discharge date:  1-2 days    Disposition: Pending disposition to a facility and precert. [] Home  [] TCU  [] Rehab  [] Psych  [x] SNF  [] [facility]  [] Other-    ===================================================================      Chief Complaint: [facility] Course: Per prior note, \"80 y. o. female who presented to [facility] 1644    Exam:  BP (!) 113/55   Pulse 77   Temp 97 °F (36.1 °C) (Axillary)   Resp 16   Ht 5' 2\" (1.575 m)   Wt 130 lb (59 kg)   SpO2 97%   BMI 23.78 kg/m²     General appearance: Chronically ill appearing, in no distress. Eyes:  PERRL. Conjunctivae/corneas clear. HENT: Head normal appearing. Nares normal. Oral mucosa moist.  Hearing impaired. Neck: Supple, with full range of motion. Trachea midline. No gross JVD appreciated. Respiratory:  Normal effort. Without wheezes or rhonchi. Diminished in bases with mild dry crackles. Cardiovascular: Normal rate, regular rhythm with normal S1/S2 without murmurs. No lower extremity edema. Abdomen: Soft, non-tender, non-distended with normal bowel sounds. Musculoskeletal: No joint swelling or tenderness. Normal tone. No abnormal movements. Skin: Warm and dry. No rashes or lesions. Neurologic:  No focal sensory/motor deficits in the upper or lower extremities. Cranial nerves:  grossly non-focal 2-12. Generalized weakness. Psychiatric: Interactive but confused. Capillary Refill: Brisk,< 3 seconds. Peripheral Pulses: +2 palpable, equal bilaterally. Labs:   Recent Labs     02/27/22  1205 02/28/22  0714   WBC 14.0* 11.8*   HGB 12.8 11.9*   HCT 40.5 38.3    292     Recent Labs     02/27/22  1205 02/28/22  0714    143   K 4.1 3.7    113*   CO2 15* 16*   BUN 27* 14   CREATININE 0.9 0.6   CALCIUM 9.2 8.7     Recent Labs     02/27/22  1205 02/28/22  0714   AST 21 24   ALT 9* 9*   BILITOT 0.8 1.0   ALKPHOS 85 82     No results for input(s): INR in the last 72 hours. Recent Labs     02/27/22  1205   CKTOTAL 212*     Recent Labs     02/27/22 1205   PROCAL 0.10*      Lab Results   Component Value Date    NITRU NEGATIVE 02/27/2022    WBCUA 0-2 02/27/2022    BACTERIA NONE SEEN 02/27/2022    RBCUA 0-2 02/27/2022    BLOODU NEGATIVE 02/27/2022    SPECGRAV 1.011 08/03/2021    GLUCOSEU NEGATIVE 02/27/2022       Radiology (48 hours):   No results found.      DVT prophylaxis:    [] Lovenox  [] SCDs  [] SQ Heparin  [] Encourage ambulation   [] Already on Anticoagulation       Diet: ADULT ORAL NUTRITION SUPPLEMENT; Breakfast, Lunch, Dinner; Frozen Oral Supplement  ADULT DIET; Easy to [employer]  Code Status: DNR-CC  PT/OT: yes  Tele: walter  IVF: na    Electronically signed by Mark Aguiar DO on 3/1/2022 at 4:44 PM

## 2022-03-01 NOTE — PROGRESS NOTES
[place] [address]  SPEECH THERAPY  [facility] MED SURG 8B  Clinical Swallow Evaluation      SLP Individual Minutes  Time In: 2412  Time Out: 4094  Minutes: 13  Timed Code Treatment Minutes: 0 Minutes       Date: 3/1/2022  Patient Name: Tilford Boast      CSN: [id number]   : 1928  (80 y.o.)  Gender: female   Referring Physician:  Sharif Nathan DO  Diagnosis: Dehydration  Secondary Diagnosis: Dysphagia     History of Present Illness/Injury: Patient admitted with above diagnosis. Per chart review, \"80 y.o. female who presented to [place] [address] with altered mental status. She is brought by EMS/daughter. Patient's mental status is altered, also she has profound hearing loss so HPI is mainly taken from daughter and EMR. Patient's daughter states that the patient is found at home, on the floor, without obvious trauma signs. Patient lives with a daughter who takes care of her, and patient's mental status worsened over the past year, but stepwise after each worsening patient ended up at hospital or in ED. the patient cannot recall important life/health information about herself. She is only oriented to person but not place and time. Currently she denies chest pain, tightness, heart palpitations, cough, sputum production, hemoptysis, abdominal pain, dysuria, fever, seizure, chills. In ED, lactic acid was found mildly elevated and started IV fluids bolus and repeat labs shows resolving lactic acidosis. CT head and neck is negative for acute findings. Chest x-ray negative for acute finding. Pelvis x-ray is negative for fracture. \" ST consulted to complete clinical swallow evaluation to assess current swallow function and recommend safest level of po intake and/or need for instrumental evaluation.      Past Medical History:   Diagnosis Date    Dementia ([facility] [address]     Hypertension        SUBJECTIVE:  CYNTHIA Myles approved completion of clinical swallow evaluation and is reporting good were identified, as medical prognosis is poor. Patient's swallow physiology appears functional to support goal for comfortable oral intake without distress. Recommend Easy to chew and thin liquids with direct 1:1 assistance and only when patient is demonstrating adequate alertness. ST completed extensive education with daughter regarding s/s of aspiration and complications associated with aspiration (respiratory failure, increased oxygen needs, frequent/prolonged hospitalizations, and event death). ST discussed risks and benefits associated with completing MBS. Daughter reporting she understands complications associated with aspiration, however, daughter would not want mother's diet restricted given DNR-CC status and focus on overall quality of life/comfort care measures. ST to continue to follow-up to provided education to family regarding dysphagia POC/management. RECOMMENDATIONS/ASSESSMENT:  Instrumental Evaluation: Instrumental evaluation not indicated at this time. Diet Recommendations:  Easy to Chew and Thin Liquids   Strategies:  Full Upright Position, Small Bite/Sip, Medications Crushed with Puree, Direct 1:1 Supervision, Alternate Solids and Liquids and Monitor for Fatigue   Rehabilitation Potential: poor    EDUCATION:  Learner: Patient and Daughter  Education:  Reviewed results and recommendations of this evaluation, Reviewed diet and strategies, Reviewed signs, symptoms and risks of aspiration, Education Related to Potential Risks and Complications Due to Impairment/Illness/Injury and Education Related to [employer] and Wellness  Evaluation of Education: Verbalizes understanding, Demonstrates with assistance and Needs further instruction    PLAN:  Skilled SLP intervention on acute care 3-5 x per week or until goals met and/or pt plateaus in function. Specific interventions for next session may include: PO trials and dysphagia education. PATIENT GOAL:    Did not state.   Will further assess during treatment. SHORT TERM GOALS:  Short-term Goals  Timeframe for Short-term Goals: 2 weeks  Goal 1: Patient will consume an easy to chew diet and thin liquids with adequate endurance to assist with meeting nutrition/hydration needs. Goal 2: Family will demonstrate understanding of aspiration, risks associated with aspirations, risk/benefits associated with completing MBS, and recommended swallow strategies with good success to make informed decision regarding dysphagia management. Goal 3: Patient will complete MBS SHOULD FAMILY request to further assess pharyngeal swallow function and recommend safest level of po intake.     LONG TERM GOALS:  No long term goals recommended d/t cayden Lauren [facility] [address], [address]

## 2022-03-01 NOTE — CARE COORDINATION
3/1/22, 2:20 PM EST    DISCHARGE ON GOING EVALUATION    [address] day: 0    Barriers to Discharge: Await family decision re: home vs ECF. OT seen, PT attempted but Brittany Guadalupe declined. Hospice did see yesterday, unsure if she meets hospice criteria at this time, they plan to follow-up. PCP: Talat Aguayo, APRN - CNP   %

## 2022-03-02 NOTE — PROGRESS NOTES
Hospitalist Progress Note    Patient: Yancy Foreman    YOB: 1928  Unit/Bed:8B-32/032-A  MRN: [id number]    Acct: [de-identified]   PCP: Steve Loaiza. SHAMA Calle CNP    Date of Admission: 2/27/2022      Assessment/Plan:   Acute Metabolic Encephalopathy: suspected secondary to volume depletion and fall on history of known dementia. Ruled out any electrolyte/metabolic and infectious etiology (normal UA no infiltrate CXR, afebrile) on initial workup. CT head no bleed or other major abnormality. TSH and B12 wnl.   o Back to baseline. Improved with IV hydration. o Delirium precautions   Fall, PTA: fall at home and unable to get up without support. Unclear circumstance - unwitnessed. CT head and C spine no fracture or bleed. o Dtr concerned cannot care for patient at home any longer  o No further workup at this time, no need for tele or echo (goals of care CC)  o See below physical deconditioning   Mild Leukocytosis: without fever and procal <0.25, suspect reactive to dehydration. Improving, no need to trend at this time.  Dementia, progressive: suspected vascular based on chronic microvascular disease in brain. Patient is at her current baseline. Suspect component of visual and hearing impairment may be contributing.  Compensated HFpEF   HTN: continue home meds    Physical Deconditioning: PT and OT - Looking into SNF - SW on case.  Goals of Care: palliative care and hospice following. Code status changed to DNR CC by Daughter [facility]) however wanting to get into some rehab first to see if can safely return home. Expected discharge date:  1-2 days    Disposition: Pending disposition to a facility and precert. Looking into [facility].    [] Home  [] TCU  [] Rehab  [] Psych  [x] SNF  [] [facility]  [] Other-    ===================================================================      Chief Complaint: [facility] Course: Per prior note, \"86 y.o. female who presented to [address] [place] [address] with altered mental status. She is brought by EMS/daughter.  Patient's mental status is altered, also she has profound hearing loss so HPI is mainly taken from daughter and EMR. Patient's daughter states that the patient is found at home, on the floor, without obvious trauma signs.  Patient lives with a daughter who takes care of her, and patient's mental status worsened over the past year, but stepwise after each worsening patient ended up at hospital or in ED. the patient cannot recall important life/health information about herself.  She is only oriented to person but not place and time.  Currently she denies chest pain, tightness, heart palpitations, cough, sputum production, hemoptysis, abdominal pain, dysuria, fever, seizure, chills. In ED, lactic acid was found mildly elevated and started IV fluids bolus and repeat labs shows resolving lactic acidosis. CT head and neck is negative for acute findings.  Chest x-ray negative for acute finding.  Pelvis x-ray is negative for fracture. \"    Subjective (past 24 hours): Patient seen at bedside, no current issues. Awaiting accepting facility. No overnight issues. VSS. Medications:  Reviewed    Infusion Medications    sodium chloride       Scheduled Medications    sodium chloride flush  5-40 mL IntraVENous 2 times per day    enoxaparin  30 mg SubCUTAneous Daily    metoprolol succinate  50 mg Oral Daily    amLODIPine  5 mg Oral Daily     PRN Meds: sodium chloride flush, sodium chloride, ondansetron **OR** ondansetron, polyethylene glycol, acetaminophen **OR** acetaminophen, hydrALAZINE      ROS: reviewed from prior note, full ROS unchanged unless otherwise stated in hospital course/subjective portion.          Intake/Output Summary (Last 24 hours) at 3/2/2022 1512  Last data filed at 3/2/2022 0337  Gross per 24 hour   Intake 340 ml   Output --   Net 340 ml       Exam:  /60   Pulse 87   Temp 98.4 °F (36.9 °C) (Oral)   Resp 18   Ht 5' 2\" (1.575 m)   Wt 130 lb (59 kg)   SpO2 97%   BMI 23.78 kg/m²     General appearance: Chronically ill appearing, in no distress. Eyes:  PERRL. Conjunctivae/corneas clear. HENT: Head normal appearing. Nares normal. Oral mucosa moist.  Hearing impaired. Neck: Supple, with full range of motion. Trachea midline. No gross JVD appreciated. Respiratory:  Normal effort. Without wheezes or rhonchi. Diminished in bases with mild dry crackles. Cardiovascular: Normal rate, regular rhythm with normal S1/S2 without murmurs. No lower extremity edema. Abdomen: Soft, non-tender, non-distended with normal bowel sounds. Musculoskeletal: No joint swelling or tenderness. Normal tone. No abnormal movements. Skin: Warm and dry. No rashes or lesions. Neurologic:  No focal sensory/motor deficits in the upper or lower extremities. Cranial nerves:  grossly non-focal 2-12. Generalized weakness. Psychiatric: Interactive but confused. Capillary Refill: Brisk,< 3 seconds. Peripheral Pulses: +2 palpable, equal bilaterally. Labs:   Recent Labs     02/28/22  0714   WBC 11.8*   HGB 11.9*   HCT 38.3        Recent Labs     02/28/22  0714      K 3.7   *   CO2 16*   BUN 14   CREATININE 0.6   CALCIUM 8.7     Recent Labs     02/28/22  0714   AST 24   ALT 9*   BILITOT 1.0   ALKPHOS 82     No results for input(s): INR in the last 72 hours. No results for input(s): Mg Magan in the last 72 hours. No results for input(s): PROCAL in the last 72 hours. Lab Results   Component Value Date    NITRU NEGATIVE 02/27/2022    WBCUA 0-2 02/27/2022    BACTERIA NONE SEEN 02/27/2022    RBCUA 0-2 02/27/2022    BLOODU NEGATIVE 02/27/2022    SPECGRAV 1.011 08/03/2021    GLUCOSEU NEGATIVE 02/27/2022       Radiology (48 hours):  No results found.      DVT prophylaxis:    [x] Lovenox  [] SCDs  [] SQ Heparin  [] Encourage ambulation   [] Already on Anticoagulation       Diet: ADULT ORAL NUTRITION SUPPLEMENT; Breakfast, Lunch, Dinner; Frozen Oral Supplement  ADULT DIET; Easy to Chew  Code Status: DNR-CC  PT/OT: yes  Tele: walter  IVF: na    Electronically signed by Oly Grullon DO on 3/2/2022 at 3:16 PM

## 2022-03-02 NOTE — PROGRESS NOTES
No  Additional Comments: Pt amb with walker in the home, Social hx obtained from EMR, pt is poor historian, very hard of hearing and has end stage dementia. Family helps assist with all tasks and care for pt. Restrictions/Precautions:  Restrictions/Precautions: General Precautions,Fall Risk  Position Activity Restriction  Other position/activity restrictions: very [place], macular degeneration     SUBJECTIVE: Ok to see pt per nursing. Pt just got back to bed when PT arrived, agreeable to bed exercises only this date. PAIN: denies    Vitals: Vitals not assessed per clinical judgement, see nursing flowsheet    OBJECTIVE:  Bed Mobility:  Not Tested    Transfers:  Not Tested    Ambulation:  Not Tested      Exercise:  Patient was guided in 1 set(s) 8-10 reps of exercise to both lower extremities. Ankle pumps, Heelslides, Hip abduction/adduction and Straight leg raises. Exercises were completed for increased independence with functional mobility. VC and tactile cues for proper technique of exercises, exercises discontinued after pt yelling out \"that is enough. \"    Functional Outcome Measures: Not completed       ASSESSMENT:  Assessment: limited progression due to reduced participation in therapy services and increased fatigue  Activity Tolerance:  Patient tolerance of  treatment: fair.      Equipment Recommendations:Equipment Needed: No  Discharge Recommendations: Subacte/Skilled Nursing Facility, ECF with PT and Patient would benefit from continued PT at discharge  Plan: Times per week: 3-5x GM  Current Treatment Recommendations: Strengthening,Home Exercise Program,Neuromuscular Re-education,Safety Education & [place] Brawn Training,Patient/Caregiver Education & Training,Functional Mobility Training,Equipment Evaluation, Education, & procurement,Transfer Training,Gait Training,Stair training    Patient Education  Patient Education: Plan of Care, Home Exercise Program, - Patient Requires Continued Education    Goals:  Patient goals : did not state  Short term goals  Time Frame for Short term goals: by discharge  Short term goal 1: Pt will amb for >20 feet with RW for support with SBA for safety to progress with mobility. Short term goal 2: Pt will demo S for transfers with RW for support to progress with overall mobility. Short term goal 3: Pt will demo IND with bed mobility tasks with good technique to progress with overall mobility. Long term goals  Time Frame for Long term goals : NA due to short ELOS    Following session, patient left in safe position with all fall risk precautions in place. Pt in bed with all needs and call light in reach with bed alarm on.

## 2022-03-02 NOTE — CARE COORDINATION
3/2/22, 10:34 AM EST    DISCHARGE ON GOING EVALUATION    [address] day: 0  Barriers to Discharge: Awaiting placement. PT/OT following. Hospice did see and plans to follow-up OP with daughter in 2 weeks. PCP: Rabia Aguayo, SHAMA - CNP   %  Patient Goals/Plan/Treatment Preferences: Referral to [facility], await decision.

## 2022-03-02 NOTE — PLAN OF CARE
Problem: Falls - Risk of:  Goal: Will remain free from falls  Description: Will remain free from falls  Outcome: Ongoing  Note: Free of falls this shift. Call light within reach. Bed alarm on. Falling star program in place. Problem: Falls - Risk of:  Goal: Absence of physical injury  Description: Absence of physical injury  Outcome: Ongoing  Note: Free of falls this shift. Call light within reach. Bed alarm on. Falling star program in place. Problem: Skin Integrity:  Goal: Will show no infection signs and symptoms  Description: Will show no infection signs and symptoms  Outcome: Ongoing  Note: No new signs or symptoms of infection noted. Problem: Skin Integrity:  Goal: Absence of new skin breakdown  Description: Absence of new skin breakdown  Outcome: Ongoing  Note: See skin assessment. Problem: Confusion - Acute:  Goal: Absence of continued neurological deterioration signs and symptoms  Description: Absence of continued neurological deterioration signs and symptoms  Outcome: Ongoing  Note: Patient has dementia at baseline. Problem: Confusion - Acute:  Goal: Mental status will be restored to baseline  Description: Mental status will be restored to baseline  Outcome: Ongoing  Note: Patient has dementia at baseline. Problem: Discharge Planning:  Goal: Ability to perform activities of daily living will improve  Description: Ability to perform activities of daily living will improve  Outcome: Ongoing  Note: Patient working with pt/ot      Problem: Injury - Risk of, Physical Injury:  Goal: Will remain free from falls  Description: Will remain free from falls  Outcome: Ongoing  Note: Free of falls this shift. Call light within reach. Bed alarm on. Falling star program in place. Problem: Mood - Altered:  Goal: Mood stable  Description: Mood stable  Outcome: Ongoing  Note: Patient calm.       Problem: Sleep Pattern Disturbance:  Goal: Appears well-rested  Description: Appears well-rested  Outcome: Ongoing  Note: Patient resting well in bed. Problem: DISCHARGE BARRIERS  Goal: Patient's continuum of care needs are met  Outcome: Ongoing  Note: Waiting for placement. Problem: Nutrition  Goal: Optimal nutrition therapy  Outcome: Ongoing  Note: See I/Os. Care plan reviewed with patient. Patient verbalize understanding of the plan of care and contribute to goal setting.

## 2022-03-02 NOTE — PROGRESS NOTES
to assist patient t/o tx session. PAIN:  Demonstrated occasional facial grimacing t/o tx session. Did not communicate pain. Vitals: Vitals not assessed per clinical judgement, see nursing flowsheet    COGNITION: Slow Processing, Decreased Recall, Decreased Insight, Impaired Memory, Decreased Safety Awareness, Difficulty Following Commands and easily agitated    ADL:   No ADL's completed this session. .  Patient declined any ADLs and had difficulty following instructions and understanding commands due to cognition and hard of hearing. BALANCE:  Sitting Balance:  Stand By Assistance. seated EOB  Standing Balance: Contact Guard Assistance. with use of 2 w/w for support    BED MOBILITY:  Supine to Sit: Stand By Assistance with bedrails and increased time to complete and initiate in task    TRANSFERS:  Sit to Stand:  Contact Guard Assistance. patient agitated that gait belt placed around her to assist with sit to stand and edu on patient safety and safe handling during transfers  Stand Pivot: [address] from EOB to recliner with 2 w/w    FUNCTIONAL MOBILITY:  Assistive Device: Rolling Walker  Assist Level:  Contact Guard Assistance. Distance: took a few steps from EOB to recliner with 2 w/w; cues for lining up with recliner to safely sit. ADDITIONAL ACTIVITIES:  RN arrived in room at end of treatment and reports patient is a feed for meals. Patient refused UB exer with resistance band. ASSESSMENT:  Activity Tolerance:  Patient tolerance of  treatment: fair. Don Chapman       Discharge Recommendations: Continue to assess pending progress, Subacute/skilled nursing facility, ECF with OT, 24 hour assistance or supervision and Patient would benefit from continued OT at discharge  Equipment Recommendations: Equipment Needed:  (continue to monitor pending discharge location)  Plan: Times per week: 5x  Current Treatment Recommendations: Strengthening,Balance Training,Functional Mobility Asha Suha / ADL    Patient Education  Patient Education: Role of OT, Plan of Care, Precautions, Reviewed Prior Education, Importance of Increasing Activity and Assistive Device Safety    Goals  Short term goals  Time Frame for Short term goals: By discharge  Short term goal 1: Patient to complete BADL task with no > min A to increase indep in dressing tasks. Short term goal 2: Patient to complete dynamic standing task x5 minutes with unilateral UE release and SBA to increase indep in sinkside grooming tasks. Short term goal 3: Patient to complete BUE AROM to light resistive HEP with min cues for technique to increase strength needed for functional transfer and mobility tasks. Short term goal 4: Patient to increase activity tolerance to/from BR and HH distances with SBA to increase indep in accessing home environment. Following session, patient left in safe position with all fall risk precautions in place.

## 2022-03-03 NOTE — PROGRESS NOTES
Hospitalist Progress Note    Patient: Naima Gasca    YOB: 1928  Unit/Bed:8B-32/032-A  MRN: [id number]    Acct: [de-identified]   PCP: Carl Chavez. Kiesha Marks, SHAMA - CNP    Date of Admission: 2/27/2022      Assessment/Plan:   Acute Metabolic Encephalopathy: suspected secondary to volume depletion and fall on history of known dementia. Ruled out any electrolyte/metabolic and infectious etiology (normal UA no infiltrate CXR, afebrile) on initial workup. CT head no bleed or other major abnormality. TSH and B12 wnl.   o Back to baseline. Improved with IV hydration. o Delirium precautions   Fall, PTA: fall at home and unable to get up without support. Unclear circumstance - unwitnessed. CT head and C spine no fracture or bleed. There is bruising throughout extremities, and there is L>R LE edema related to bruising of the left leg but no pain. i. RN noted bruising worsening throughout day, will check VL dup LE. Consider XR if negative to rule out occult fracture. o Dtr concerned cannot care for patient at home any longer  o No further workup at this time, no need for tele or echo (goals of care CC)  o See below physical deconditioning   Mild Leukocytosis: without fever and procal <0.25, suspect reactive to dehydration. Improving, no need to trend at this time.  Dementia, progressive: suspected vascular based on chronic microvascular disease in brain. Patient is at her current baseline. Suspect component of visual and hearing impairment may be contributing.  Compensated HFpEF   HTN: continue home meds    Physical Deconditioning: PT and OT - Looking into SNF - SW on case.  Goals of Care: palliative care and hospice following. Code status changed to DNR CC by Daughter [facility]) however wanting to get into some rehab first to see if can safely return home.      Discussed that if patient is not progressing to the point of safe DC home from SNF, dtr could initiate hospice and have patient return to home. Expected discharge date:  1-2 days    Disposition: Pending disposition to a facility and precert. Looking into [facility]. [] Home  [] TCU  [] Rehab  [] Psych  [x] SNF  [] [place]  [] Other-    ===================================================================      Chief Complaint: [facility] Course: Per prior note, \"80 y. o. female who presented to [facility] with altered mental status. She is brought by EMS/daughter.  Patient's mental status is altered, also she has profound hearing loss so HPI is mainly taken from daughter and EMR. Patient's daughter states that the patient is found at home, on the floor, without obvious trauma signs.  Patient lives with a daughter who takes care of her, and patient's mental status worsened over the past year, but stepwise after each worsening patient ended up at hospital or in ED. the patient cannot recall important life/health information about herself.  She is only oriented to person but not place and time.  Currently she denies chest pain, tightness, heart palpitations, cough, sputum production, hemoptysis, abdominal pain, dysuria, fever, seizure, chills. In ED, lactic acid was found mildly elevated and started IV fluids bolus and repeat labs shows resolving lactic acidosis. CT head and neck is negative for acute findings.  Chest x-ray negative for acute finding.  Pelvis x-ray is negative for fracture. \"    Subjective (past 24 hours): Patient seen at bedside, no current issues. Awaiting accepting facility. No overnight issues. VSS. Discussed with daughter at bedside. No questions at this time.      Medications:  Reviewed    Infusion Medications    sodium chloride       Scheduled Medications    sodium chloride flush  5-40 mL IntraVENous 2 times per day    enoxaparin  30 mg SubCUTAneous Daily    metoprolol succinate  50 mg Oral Daily    amLODIPine  5 mg Oral Daily     PRN Meds: sodium chloride flush, sodium chloride, ondansetron **OR** ondansetron, polyethylene glycol, acetaminophen **OR** acetaminophen, hydrALAZINE      ROS: reviewed from prior note, full ROS unchanged unless otherwise stated in hospital course/subjective portion. Intake/Output Summary (Last 24 hours) at 3/3/2022 1511  Last data filed at 3/3/2022 0932  Gross per 24 hour   Intake 120 ml   Output --   Net 120 ml       Exam:  /81   Pulse 92   Temp 97.6 °F (36.4 °C) (Oral)   Resp 14   Ht 5' 2\" (1.575 m)   Wt 130 lb (59 kg)   SpO2 97%   BMI 23.78 kg/m²     General appearance: Chronically ill appearing, in no distress. Eyes:  PERRL. Conjunctivae/corneas clear. HENT: Head normal appearing. Nares normal. Oral mucosa moist.  Hearing impaired. Neck: Supple, with full range of motion. Trachea midline. No gross JVD appreciated. Respiratory:  Normal effort. Without wheezes or rhonchi. Diminished in bases with mild dry crackles. Cardiovascular: Normal rate, regular rhythm with normal S1/S2 without murmurs. Trace-1+ LE edema, L>R with bruising on the left leg. Abdomen: Soft, non-tender, non-distended with normal bowel sounds. Musculoskeletal: No joint swelling or tenderness. Normal tone. No abnormal movements. Skin: Warm and dry. No rashes or lesions. Neurologic:  No focal sensory/motor deficits in the upper or lower extremities. Cranial nerves:  grossly non-focal 2-12. Generalized weakness. Psychiatric: Interactive but confused. Capillary Refill: Brisk,< 3 seconds. Peripheral Pulses: +2 palpable, equal bilaterally. Labs:   No results for input(s): WBC, HGB, HCT, PLT in the last 72 hours. No results for input(s): NA, K, CL, CO2, BUN, CREATININE, CALCIUM, PHOS in the last 72 hours. Invalid input(s): MAGNES  No results for input(s): AST, ALT, BILIDIR, BILITOT, ALKPHOS in the last 72 hours. No results for input(s): INR in the last 72 hours.   No results for input(s): Sylvester Grates in the last 72 hours. No results for input(s): PROCAL in the last 72 hours. Lab Results   Component Value Date    NITRU NEGATIVE 02/27/2022    WBCUA 0-2 02/27/2022    BACTERIA NONE SEEN 02/27/2022    RBCUA 0-2 02/27/2022    BLOODU NEGATIVE 02/27/2022    SPECGRAV 1.011 08/03/2021    GLUCOSEU NEGATIVE 02/27/2022       Radiology (48 hours):  No results found.      DVT prophylaxis:    [x] Lovenox  [] SCDs  [] SQ Heparin  [] Encourage ambulation   [] Already on Anticoagulation       Diet: ADULT ORAL NUTRITION SUPPLEMENT; Breakfast, Lunch, Dinner; Frozen Oral Supplement  ADULT DIET; Easy to Dig[place]  Code Status: DNR-CC  PT/OT: yes  Tele: walter  IVF: na    Electronically signed by Nallely Crooks DO on 3/3/2022 at 3:11 PM

## 2022-03-03 NOTE — PROGRESS NOTES
Chart reviewed and placement appears to be pending. Family has received hospice information and patient is accepted to hospice although daughter would like for the patient to have therapy at [facility] prior to return to home. No needs noted at this time for palliative care. Please call palliative care if needs arise.

## 2022-03-03 NOTE — CARE COORDINATION
3/3/22, 3:29 PM EST    DISCHARGE ON GOING EVALUATION    [address] day: 0  Barriers to Discharge: Await precert  PCP: Lloyd Rodriguez. SHAMA Pearce - CNP   %  Patient Goals/Plan/Treatment Preferences: Plans [facility] if precert approved.

## 2022-03-03 NOTE — DISCHARGE INSTR - COC
Continuity of Care Form    Patient Name: Maurice Hayes   :  1928  MRN:  [id number]    Admit date:  2022  Discharge date:  2022    Code Status Order: DNR-CC   Advance Directives:      Admitting Physician:  Thierry Monte MD  PCP: Priti North, APRN - CNP    Discharging Nurse: [facility] [place] Unit/Room#: 8B-32/032-A  Discharging Unit Phone Number: [phone]    Emergency Contact:   Extended Emergency Contact Information  Primary Emergency Contact: [address] Phone: [phone]  Mobile Phone: [phone]  Relation: Child  Preferred language: English  Secondary Emergency [facility] Phone: [phone]  Mobile Phone: [phone]  Relation: Grandchild   needed? No    Past Surgical History:  Past Surgical History:   Procedure Laterality Date    APPENDECTOMY      CARDIAC PACEMAKER PLACEMENT      CARDIAC PACEMAKER PLACEMENT         Immunization History: There is no immunization history on file for this patient. Active Problems:  Patient Active Problem List   Diagnosis Code    Acute upper respiratory infection J06.9    Confusion R41.0    Closed head injury S09.90XA    Accident due to mechanical fall without injury W19. XXXA    Accidental fall from bed W06. XXXA    Dehydration E86.0    Moderate malnutrition (HCC) E44.0       Isolation/Infection:   Isolation            No Isolation          Patient Infection Status       Infection Onset Added Last Indicated Last Indicated By Review Planned Expiration Resolved Resolved By    None active    Resolved    COVID-19 (Rule Out) 22 COVID-19, Rapid (Ordered)   22 Rule-Out Test Resulted    COVID-19 (Rule Out) 21 COVID-19, Rapid (Ordered)   21 Rule-Out Test Resulted            Nurse Assessment:  Last Vital Signs: BP (!) 147/64   Pulse 85   Temp 98.3 °F (36.8 °C) (Oral)   Resp 16   Ht 5' 2\" (1.575 m)   Wt 130 lb (59 kg)   SpO2 97%   BMI 23.78 and Occupational Therapy  Weight Bearing Status/Restrictions: No weight bearing restirctions  Other Medical Equipment (for information only, NOT a DME order):  walker  Other Treatments: None    Patient's personal belongings (please select all that are sent with patient):  None    RN SIGNATURE:  Electronically signed by Miranda Haney RN on 3/4/22 at 1:18 PM EST    CASE MANAGEMENT/SOCIAL WORK SECTION    Inpatient Status Date: Observation    Readmission Risk Assessment Score:  Readmission Risk              Risk of Unplanned Readmission:  0           Discharging to Facility/ Agency   [facility]: [facility]  Address: [address] [place], [place] [address]  Phone: [phone]  Fax: [phone]    Dialysis Facility (if applicable)   Name:  Address:  Dialysis Schedule:  Phone:  Fax:    / signature: Electronically signed by PAT Lowery on 3/3/22 at 3:49 PM EST    PHYSICIAN SECTION    Prognosis: Poor    Condition at Discharge: Stable    Rehab Potential (if transferring to Rehab): Guarded    Recommended Labs or Other Treatments After Discharge:     Physician Certification: I certify the above information and transfer of Marco Antonio Tapia  is necessary for the continuing treatment of the diagnosis listed and that she requires [facility] for greater 30 days.      Update Admission H&P:  dvt reported day of discharge; keep at bedrest x 5 days    PHYSICIAN SIGNATURE:  Electronically signed by Chalino Gomes MD on 3/4/22 at 11:38 AM EST

## 2022-03-03 NOTE — PROGRESS NOTES
[address] [place] [address] 8B  Occupational Therapy  Daily Note  Time:   Time In: 1500  Time Out: 1525  Timed Code Treatment Minutes: 25 Minutes  Minutes: 25    Date: 3/3/2022  Patient Name: Benito Hurst,   Gender: female      Room: -32/032-A  MRN: [id number]  : 1928  (80 y.o.)  Referring Practitioner: Manuel Nathan DO  Diagnosis: Dehydration  Additional Pertinent Hx: Per H&P: \"80 y.o. female who presented to [address] [place] [address] with altered mental status. She is brought by EMS/daughter. Patient's mental status is altered, also she has profound hearing loss so HPI is mainly taken from daughter and EMR. Patient's daughter states that the patient is found at home, on the floor, without obvious trauma signs. Patient lives with a daughter who takes care of her, and patient's mental status worsened over the past year, but stepwise after each worsening patient ended up at hospital or in ED. the patient cannot recall important life/health information about herself. She is only oriented to person but not place and time. Currently she denies chest pain, tightness, heart palpitations, cough, sputum production, hemoptysis, abdominal pain, dysuria, fever, seizure, chills. CT head and neck is negative for acute findings. Chest x-ray negative for acute finding. Pelvis x-ray is negative for fracture. \"    Restrictions/Precautions:  Restrictions/Precautions: General Precautions,Fall Risk  Position Activity Restriction  Other position/activity restrictions: very [place], macular degeneration     SUBJECTIVE: RN approved OT session. Call light on upon arrival with daughter present stating pt is trying to climb out of bed and needs to be changed. Patient observed trying to get out of bed between bed rails. Increased time required due to pt incont of urine and for communication due to [facility]     PAIN: pt with no complaints of pain    Vitals: Vitals not assessed per clinical judgement, see nursing flowsheet    COGNITION: Slow Processing, Decreased Recall, Decreased Insight, Impaired Memory, Decreased Problem Solving, Decreased Safety Awareness and Difficulty Following Commands    ADL:   Lower Extremity Dressing: Maximum Assistance. to adjust B socks prior to transfer  Toileting: Moderate Assistance. with pt incont of urine during transfer - assist to don brief and complete pericares, pt able to pull up brief from knee height but requires assistance for adjustment of tabs. BALANCE:  Sitting Balance:  Stand By Assistance. due to pt impulsive   Standing Balance: Stand By Assistance, [employer]. with 1-2 UE support onRW    BED MOBILITY:  Supine to Sit: Minimal Assistance, with head of bed flat, with verbal cues  with assist required at trunk and to bring BLEs to EOB    TRANSFERS:  Sit to Stand:  Contact Guard Assistance, Minimal Assistance, cues for hand placement. Stand to Sit: Minimal Assistance. with cues for hand placement and assist for positioning of walker    FUNCTIONAL MOBILITY:  Assistive Device: Rolling Walker  Assist Level:  Contact Guard Assistance. Distance: To and from bathroom  Pt running walker into obstacles at times, but able to self correct with increased time for processing. No LOB, pt does require min A when turning. ASSESSMENT:     Activity Tolerance:  Patient tolerance of  treatment: fair.        Discharge Recommendations: Subacute/skilled nursing facility and 24 hour assistance or supervision  Equipment Recommendations: Equipment Needed:  (continue to monitor pending discharge location)  Plan: Times per week: 5x  Current Treatment Recommendations: Raleigh Zhang Mobility Training,Endurance Training,Self-Care / ADL    Patient Education  Patient Education: Plan of Care and ADL's    Goals  Short term goals  Time Frame for Short term goals: By discharge  Short term goal 1: Patient to complete BADL task with no > min A to increase indep in dressing tasks. Short term goal 2: Patient to complete dynamic standing task x5 minutes with unilateral UE release and SBA to increase indep in sinkside grooming tasks. Short term goal 3: Patient to complete BUE AROM to light resistive HEP with min cues for technique to increase strength needed for functional transfer and mobility tasks. Short term goal 4: Patient to increase activity tolerance to/from BR and HH distances with SBA to increase indep in accessing home environment. Following session, patient left in safe position with all fall risk precautions in place.

## 2022-03-04 NOTE — DISCHARGE SUMMARY
Discharge Summary    Patient: Mignon Victoria  YOB: 1928    MRN: [id number]   Acct: [de-identified]    Primary Care Physician: SHAMA Canela CNP    Admit date:  2/27/2022    Discharge date:   3/4/2022        Discharge Diagnoses:   Accident due to mechanical fall without injury  Principal Problem:    Accident due to mechanical fall without injury  Active Problems:    Accidental fall from bed    Dehydration    Moderate malnutrition (HCC)  Resolved Problems:    * No resolved hospital problems. *        Admitted for: (HPI) fall, dementia    Hospital Course: this is an elderly pt who had been living at home. The day of admission her daughter found her on the floor and brought her to be evaluated. She had been having a progressive dementia over the past year. She was admitted, given physical therapy. There were no overt injuries. Her daughter realized she was no longer to care for her further and she was discharged to a nursing home, presumably permanently. ADDENDUM: an extensive dvt left leg was reported the day of discharge. She was started on Eliquis. Recommend bedrest, elevate, heat x 5 days. eliquis should probably continue indefinitely unless bleeding complications.          Discharge Medications:       Medication List      START taking these medications    polyethylene glycol 17 g packet  Commonly known as: GLYCOLAX  Take 17 g by mouth daily as needed for Constipation        CHANGE how you take these medications    amLODIPine 5 MG tablet  Commonly known as: NORVASC  Take 1 tablet by mouth daily  Start taking on: March 5, 2022  What changed:   · medication strength  · how much to take        CONTINUE taking these medications    metoprolol succinate 50 MG extended release tablet  Commonly known as: TOPROL XL  Take 1 tablet by mouth daily  Start taking on: March 5, 2022        STOP taking these medications    megestrol 40 MG tablet  Commonly known as: MEGACE           Where to Get Your Medications      Information about where to get these medications is not yet available    Ask your nurse or doctor about these medications  · amLODIPine 5 MG tablet  · metoprolol succinate 50 MG extended release tablet  · polyethylene glycol 17 g packet           Physical Exam:    Vitals:  Vitals:    03/03/22 1310 03/03/22 1539 03/03/22 1953 03/04/22 0755   BP:  139/65 (!) 118/50 (!) 147/64   Pulse:  74 90 85   Resp:  14 16 16   Temp:  98 °F (36.7 °C) 97.2 °F (36.2 °C) 98.3 °F (36.8 °C)   TempSrc:  Oral Axillary Oral   SpO2: 97% 96% 98% 97%   Weight:       Height:         Weight: Weight: 130 lb (59 kg)     24 hour intake/output:    Intake/Output Summary (Last 24 hours) at 3/4/2022 1138  Last data filed at 3/4/2022 0906  Gross per 24 hour   Intake 120 ml   Output --   Net 120 ml       General appearance - chronically ill appearing  Chest - clear to auscultation, no wheezes, rales or rhonchi, symmetric air entry  Heart - normal rate, regular rhythm, normal S1, S2, no murmurs, rubs, clicks or gallops  Abdomen - soft, nontender, nondistended, no masses or organomegaly  Obese: No; Protuberant: No   Neurological - no focal deficits  Extremities - no pedal edema noted  Skin - normal coloration and turgor, no rashes, no suspicious skin lesions noted      Labs can be found in the Lab tab of Chart Review    Diet:  ADULT ORAL NUTRITION SUPPLEMENT; Breakfast, Lunch, Dinner; Frozen Oral Supplement  ADULT DIET; Easy to Chew    Activity:  Activity as tolerated (Patient may move about with assist as indicated or with supervision.)    Follow-up:  in the next few weeks with Chuckie Marino.  SHAMA Garcia CNP,     Disposition: SNF    Condition: Stable      Time Spent: 30 minutes    Electronically signed by Ella Tran MD on 3/4/2022 at 11:38 AM    Discharging Hospitalist

## 2022-03-04 NOTE — CARE COORDINATION
3/4/22, 11:41 AM EST    Patient goals/plan/ treatment preferences discussed by  and . Patient goals/plan/ treatment preferences reviewed with patient/ family. Patient/ family verbalize understanding of discharge plan and are in agreement with goal/plan/treatment preferences. Understanding was demonstrated using the teach back method. AVS provided by RN at time of discharge, which includes all necessary medical information pertaining to the patients current course of illness, treatment, post-discharge goals of care, and treatment preferences. Services After Discharge  Services At/After Discharge: In [facility])         VENKATESH received [facility] from [facility]. Insurance has approved ECF admission for today. Pt will be [facility] under her [address] Medicare. VENKATESH notified pts daughter, Mayuri Conner. VENKATESH faxed AVS.  LACP for transportation at 4-4:30 pm.  RN [facility] is aware.

## 2022-03-04 NOTE — PROGRESS NOTES
Assessment and Plan:        1. Trauma by fall- no fxs; getting PT  2. Dementia- apparently worse during the day  3. hbp- stable        CC:  fall  HPI:  Pt with progressive dementia, hbp, found on floor at home. Getting PT. To go to nh tentatively. ROS (12 point review of systems completed. Pertinent positives noted. Otherwise ROS is negative) :     PMH:  Per HPI  SHX:  Lived at home  [address]: Noncontributory    Allergies: See above    Medications:     sodium chloride        sodium chloride flush  5-40 mL IntraVENous 2 times per day    enoxaparin  30 mg SubCUTAneous Daily    metoprolol succinate  50 mg Oral Daily    amLODIPine  5 mg Oral Daily       Vital Signs:   BP (!) 118/50   Pulse 90   Temp 97.2 °F (36.2 °C) (Axillary)   Resp 16   Ht 5' 2\" (1.575 m)   Wt 130 lb (59 kg)   SpO2 98%   BMI 23.78 kg/m²      Intake/Output Summary (Last 24 hours) at 3/4/2022 0531  Last data filed at 3/3/2022 0932  Gross per 24 hour   Intake 120 ml   Output --   Net 120 ml        Physical Examination: General appearance - chronically ill appearing  Mental status - drowsy  Neck - supple, no significant adenopathy, no JVD, or carotid bruits  Chest - clear to auscultation, no wheezes, rales or rhonchi, symmetric air entry  Heart - normal rate, regular rhythm, normal S1, S2, no murmurs, rubs, clicks or gallops  Abdomen - soft, nontender, nondistended, no masses or organomegaly  Neurological - no focal deficits noted  Musculoskeletal - no muscular tenderness noted  Extremities - no pedal edema noted  Skin - normal coloration and turgor, no rashes, no suspicious skin lesions noted    Data: (All radiographs, tracings, PFTs, and imaging are personally viewed and interpreted unless otherwise noted).     Reviewed last labs; all satisfactory      Electronically signed by Clive Faith MD on 3/4/2022 at 5:31 AM

## 2022-03-24 NOTE — ED NOTES
Dr. Emily Yang at bedside evaluating/cleaning pt wound on back of head. 1 staple given to laceration on back of head at this time.       Sonia Martin, RN  03/24/22 5861

## 2022-03-24 NOTE — ED NOTES
Pt to ED by JHOAN from [facility]. Pt was attempting to get out of her chair when she fell and hit her head on the end table. Pt did not LOC. 1 inch laceration on posterior L scalp; small hematoma on L temporal. Bleeding is controlled at this time. Pt takes eliquis for a lower extremity DVT; L leg is more swollen compared to right leg. C-collar applied by Dr. Jorge Luis Cazares upon arrival. VSS. Airway patent. Pt has a hx of dementia; appears to be at her baseline. Will monitor.        Praneeth Prince RN  03/24/22 3269

## 2022-03-24 NOTE — ED NOTES
Dr. Shankar Hall at bedside      [facility]  03/24/22 0252

## 2022-03-24 NOTE — CONSULTS
Cesar Moreno     Patient: Willard Lau  Admit date: 3/24/2022   YOB: 1928 Date of Evaluation: 3/24/2022  MRN: [id number]  Acct: [de-identified]    Injury Date:3/24/2022  Injury time:PTA  PCP: Gallito Valentine, APRN - CNP   Referring physician: Dr. Shun Lopez - ER provider    Time of Trauma Surgeon Notification:  [phone]  Time of Trama LOPEZ Arrival: 1708  Time of Trauma Surgeon Arrival:      Assessment:    Trauma by fall  Posterior head laceration  Left forehead hematoma  Left upper arm ecchymosis  Plan:    No acute traumatic injuries requiring admission to the trauma service. Patient may be discharged back to the nursing home. Activation: []Level I (Trauma Alert) [x]Level II (Injury Call) []Level III (Trauma Consult) []Downgraded  Mode of Arrival: EMS transportation  Referring Facility: N/A   Loss of Consciousness [x]No []Yes[]Unknown  Duration(min)  Mechanism of Injury:  []Motor Vehicle crash   []Single Vehicle [] []Passenger []Scene Fatality []Front Seat  []Restrained   []Air Bag Deployed   []Ejected []Rollover []Pedestrian []Trapped   Type of vehicle:   Protective Devices:   []Motorcycle  Wearing Helmet []Yes []No  []Bicycle  Wearing Helmet []Yes []No  [x]Fall   Distance - ground level    []Assault    Abuse Reported []Yes []No  []Gunshot  []Stabbing  []Work Related  []Burn: []Flame []Scald []Electrical []Chemical []Contact []Inhalation []House Fire  []Other:   Patient Active Problem List   Diagnosis    Acute upper respiratory infection    Confusion    Closed head injury    Accident due to mechanical fall without injury    Accidental fall from bed    Dehydration    Moderate malnutrition (HCC)    Vascular dementia with behavior disturbance ([facility] [address]     Subjective   Chief Complaint: Fall    History of Present Illness: Darris Canavan is a 80year old female presenting to the Emergency department via EMS for evaluation of a fall at the [facility].   She reportedly fell backwards and hit the back of her head on a nightstand. There was no loss of consciousness. She has a history of vascular dementia and is not able to provide much history. She was recently diagnosed with a DVT to the LLE and is taking Eliquis. She suffered a small laceration to the back of her head. Review of Systems:   Review of Systems   Unable to perform ROS: Dementia       Pcn [penicillins]  Past Surgical History:   Procedure Laterality Date    APPENDECTOMY      CARDIAC PACEMAKER PLACEMENT      CARDIAC PACEMAKER PLACEMENT       Past Medical History:   Diagnosis Date    Dementia ([facility] [address]     Hypertension      Past Surgical History:   Procedure Laterality Date    APPENDECTOMY      CARDIAC PACEMAKER PLACEMENT      CARDIAC PACEMAKER PLACEMENT       Social History     Socioeconomic History    Marital status:      Spouse name: Not on file    Number of children: Not on file    Years of education: Not on file    Highest education level: Not on file   Occupational History    Not on file   Tobacco Use    Smoking status: Former Smoker    Smokeless tobacco: Never Used   Vaping Use    Vaping Use: Never used   Substance and Sexual Activity    Alcohol use: Not Currently    Drug use: Never    Sexual activity: Not on file   Other Topics Concern    Not on file   Social History Narrative    Not on file     Social Determinants of Health     Financial Resource Strain:     Difficulty of Paying Living Expenses: Not on file   Food Insecurity:     Worried About [address] in the Last Year: Not on file    [address] in the Last Year: Not on file   Transportation Needs:     Lack of Transportation (Medical): Not on file    Lack of Transportation (Non-Medical):  Not on file   Physical Activity:     Days of Exercise per Week: Not on file    Minutes of Exercise per Session: Not on file   Stress:     Feeling of Stress : Not on file   Social Connections:     Frequency of Communication with Friends and Family: Not on file    Frequency of Social Gatherings with Friends and Family: Not on file    Attends Jew Services: Not on file    Active Member of Clubs or Organizations: Not on file    Attends Club or Organization Meetings: Not on file    Marital Status: Not on file   Intimate Partner Violence:     Fear of Current or Ex-Partner: Not on file    Emotionally Abused: Not on file    Physically Abused: Not on file    Sexually Abused: Not on file   Housing Stability:     Unable to Pay for Housing in the Last Year: Not on file    Number of [place] in the Last Year: Not on file    Unstable Housing in the Last Year: Not on file     No family history on file.     Home medications:    Previous Medications    AMLODIPINE (NORVASC) 5 MG TABLET    Take 1 tablet by mouth daily    APIXABAN (ELIQUIS) 2.5 MG TABS TABLET    Take 1 tablet by mouth 2 times daily Start after the one week loading dose of 10 mg bid    APIXABAN (ELIQUIS) 5 MG TABS TABLET    Take 2 tablets by mouth 2 times daily for 14 doses    METOPROLOL SUCCINATE (TOPROL XL) 50 MG EXTENDED RELEASE TABLET    Take 1 tablet by mouth daily    POLYETHYLENE GLYCOL (GLYCOLAX) 17 G PACKET    Take 17 g by mouth daily as needed for Constipation       Hospital medications:  Scheduled Meds:   Tetanus-Diphth-Acell Pertussis  0.5 mL IntraMUSCular Once     Continuous Infusions:  PRN Meds:  Objective   ED TRIAGE VITALS  BP: (!) 155/65, Temp: 97.1 °F (36.2 °C), Pulse: 80, Resp: 17, SpO2: 97 %  Ira Coma Scale  Eye Opening: Spontaneous  Best Verbal Response: Oriented  Best Motor Response: Obeys commands  [place] Coma Scale Score: 15  Results for orders placed or performed during the hospital encounter of 03/24/22   CBC with Auto Differential   Result Value Ref Range    WBC 11.9 (H) 4.8 - 10.8 thou/mm3    RBC 3.98 (L) 4.20 - 5.40 mill/mm3    Hemoglobin 11.6 (L) 12.0 - 16.0 gm/dl    Hematocrit 37.0 37.0 - 47.0 %    MCV 93.0 81.0 - 99.0 fL    MCH 29.1 26.0 - 33.0 pg MCHC 31.4 (L) 32.2 - 35.5 gm/dl    RDW-CV 14.1 11.5 - 14.5 %    RDW-SD 48.0 (H) 35.0 - 45.0 fL    Platelets 748 (H) 474 - 400 thou/mm3    MPV 10.7 9.4 - 12.4 fL    Seg Neutrophils 73.7 %    Lymphocytes 14.9 %    Monocytes 7.2 %    Eosinophils 2.6 %    Basophils 1.1 %    Immature Granulocytes 0.5 %    Segs Absolute 8.8 (H) 1.8 - 7.7 thou/mm3    Lymphocytes Absolute 1.8 1.0 - 4.8 thou/mm3    Monocytes Absolute 0.9 0.4 - 1.3 thou/mm3    Eosinophils Absolute 0.3 0.0 - 0.4 thou/mm3    Basophils Absolute 0.1 0.0 - 0.1 thou/mm3    Immature Grans (Abs) 0.06 0.00 - 0.07 thou/mm3    nRBC 0 /100 wbc   Basic Metabolic Panel w/ Reflex to MG   Result Value Ref Range    Sodium 138 135 - 145 meq/L    Potassium reflex Magnesium 3.8 3.5 - 5.2 meq/L    Chloride 102 98 - 111 meq/L    CO2 22 (L) 23 - 33 meq/L    Glucose 166 (H) 70 - 108 mg/dL    BUN 11 7 - 22 mg/dL    CREATININE 0.8 0.4 - 1.2 mg/dL    Calcium 9.2 8.5 - 10.5 mg/dL   Hepatic Function Panel   Result Value Ref Range    Albumin 3.8 3.5 - 5.1 g/dL    Total Bilirubin 0.6 0.3 - 1.2 mg/dL    Bilirubin, Direct <0.2 0.0 - 0.3 mg/dL    Alkaline Phosphatase 122 38 - 126 U/L    AST 18 5 - 40 U/L    ALT 10 (L) 11 - 66 U/L    Total Protein 7.4 6.1 - 8.0 g/dL   Lipase   Result Value Ref Range    Lipase 26.0 5.6 - 51.3 U/L   Troponin   Result Value Ref Range    Troponin T < 0.010 ng/ml   Brain Natriuretic Peptide   Result Value Ref Range    Pro-.4 0.0 - 1800.0 pg/mL   Anion Gap   Result Value Ref Range    Anion Gap 14.0 8.0 - 16.0 meq/L   Osmolality   Result Value Ref Range    Osmolality Calc 278.8 275.0 - 300.0 mOsmol/kg   Glomerular Filtration Rate, Estimated   Result Value Ref Range    Est, Glom Filt Rate 67 (A) ml/min/1.73m2   EKG 12 Lead   Result Value Ref Range    Ventricular Rate 81 BPM    Atrial Rate 81 BPM    P-R Interval 158 ms    QRS Duration 76 ms    Q-T Interval 382 ms    QTc Calculation (Bazett) 443 ms    P Axis 75 degrees    T Axis 66 degrees   TYPE AND SCREEN Result Value Ref Range    ABO O     Rh Factor NEG     Antibody Screen NEG        Physical Exam:  Patient Vitals for the past 24 hrs:   BP Temp Temp src Pulse Resp SpO2 Height Weight   03/24/22 1824 (!) 155/65   80 17 97 %     03/24/22 1747 (!) 145/60   77 18 98 %     03/24/22 1726    79 18 98 %     03/24/22 1704 (!) 147/54 97.1 °F (36.2 °C) Axillary 82 21 94 % 5' 2\" (1.575 m) 130 lb (59 kg)   03/24/22 1703 (!) 147/54 97.1 °F (36.2 °C)  90 17 96 %       Primary Assessment:  Airway: Patent, trachea midline  Breathing: Breath sounds present and equal bilaterally, spontaneous, and unlabored  Circulation: Hemodynamically stable, 2+ central and peripheral pulses. Disability: AGUILAR x 4, following commands. GCS =14    Secondary Assessment:  General: Alert, NAD. Head: Normocephalic, mid face stable. 0.5cm laceration to the occipital scalp with no active bleeding. Hematoma to the left forehead. Tympanic membranes intact. Nares patent bilaterally, no epistaxis. Mouth clear of foreign bodies, no lacerations or abrasions. Eyes: PERRLA. EOMI. Nontraumatic. Neurologic: A & O x1. Following commands. CN 2-12 intact  Neck: Immobilized in cervical collar, trachea midline. Cervical spines NTTP midline, without step-offs, crepitus or deformity. Back:TL spines are NTTP midline, without step-offs, crepitus or deformity. No abrasions, contusions, or ecchymosis noted. Lungs: Clear to auscultation bilaterally. Chest Wall: Chest rise symmetrical.  Chest wall without tenderness to palpation. No crepitus, deformities, lacerations, or abrasions. Heart: RRR. Normal S1/S2. No obvious M/G/R. Abdomen:  Soft, NTTP. No guarding. Non-peritoneal.  Pelvis:  NTTP, stable to compression. Femoral pulses 2+. GI/: No blood at the urinary meatus. No gross hematuria. Extremities: No gross deformities. Ecchymosis to the left upper arm. PMS intact. Radial /DP/PT pulses 2+ bilaterally. Skin: Skin warm and dry.   Normal for ethnicity. Radiology:     CT Head WO Contrast   Final Result   Impression:   1. No acute intracranial finding. 2. Nonspecific moderate white matter hypoattenuation, most frequently    ascribed to chronic small vessel ischemic disease. 3. Left lateral frontal scalp contusion. This document has been electronically signed by: Claudio De MD on 03/24/2022    06:35 PM      All CTs at this facility use dose modulation techniques and iterative    reconstructions, and/or weight-based dosing   when appropriate to reduce radiation to a low as reasonably achievable. XR HUMERUS LEFT (MIN 2 VIEWS)   Final Result   No fracture or acute malalignment. This document has been electronically signed by: Claudio De MD on 03/24/2022    05:38 PM      XR CHEST PORTABLE   Final Result   Impression:   No acute cardiopulmonary finding. This document has been electronically signed by: Claudio De MD on 03/24/2022    05:40 PM      XR PELVIS (1-2 VIEWS)   Final Result   Impression:   No acute finding. This document has been electronically signed by: Claudio De MD on 03/24/2022    05:48 PM      CT CERVICAL SPINE WO CONTRAST    (Results Pending)     Fast Exam: No    Electronically signed by SHAMA Argueta CNP on 3/24/2022 at 6:35 PM Patient seen and examined independently by me. Above discussed and I agree with CNP. Labs, cultures, and radiographs where available were reviewed. See orders for the updated patient care plan. Hadley Lawler MD MD, this was a level 2 trauma consult time called was 4:56 PM seen was 5:03 PM mechanism was fall with head trauma on Eliquis at nursing home. 60-year-old female who apparently was just recently treated for a DVT of the left leg was placed on Eliquis. Patient fell at the nursing home hitting the back of her head small laceration was brought in to the ER for evaluation.   Patient was unable to answer any questions what was awake but appeared to be oblivious to surroundings she basically was just screaming with any attempt at blood pressure on the arm and/or IV.   Laceration scalp was small otherwise there is no signs of acute trauma work-up was as above was negative okay for patient be transferred back to the nursing home  3/24/2022   9:58 PM

## 2022-03-24 NOTE — ED NOTES
Pt and daughter updated on plan of care. Voiced no needs. Call light in reach.      Annelise Watson RN  03/24/22 7910

## 2022-03-24 NOTE — ED PROVIDER NOTES
[address] [facility] [address]          Pt Name: Seb Anton  MRN: [id number]  [place] 1/21/1928  Date of evaluation: 3/24/2022  Treating Resident Physician: Samson Holstein, MD  Supervising Physician: Dr. Debbie Lucas       Chief Complaint   Patient presents with   [address] blood thinners     History obtained from chart review. HISTORY OF PRESENT ILLNESS    HPI  Seb Anton is a 80 y.o. female who presents to the emergency department for evaluation of fall on Eliquis at the nursing home. Apparently fell backwards and hit her the back of her head on a nightstand. Patient has a h/o vascular dementia. The patient has no other acute complaints at this time. REVIEW OF SYSTEMS   Review of Systems   Unable to perform ROS: Mental status change         PAST MEDICAL AND SURGICAL HISTORY     Past Medical History:   Diagnosis Date    Dementia ([facility] [address]     Hypertension      Past Surgical History:   Procedure Laterality Date    APPENDECTOMY      CARDIAC PACEMAKER PLACEMENT      CARDIAC PACEMAKER PLACEMENT           MEDICATIONS   No current facility-administered medications for this encounter.     Current Outpatient Medications:     metoprolol succinate (TOPROL XL) 50 MG extended release tablet, Take 1 tablet by mouth daily, Disp: 30 tablet, Rfl: 3    amLODIPine (NORVASC) 5 MG tablet, Take 1 tablet by mouth daily, Disp: 30 tablet, Rfl: 3    polyethylene glycol (GLYCOLAX) 17 g packet, Take 17 g by mouth daily as needed for Constipation, Disp: 527 g, Rfl: 1    apixaban (ELIQUIS) 5 MG TABS tablet, Take 2 tablets by mouth 2 times daily for 14 doses, Disp: 60 tablet, Rfl:     apixaban (ELIQUIS) 2.5 MG TABS tablet, Take 1 tablet by mouth 2 times daily Start after the one week loading dose of 10 mg bid, Disp: 60 tablet, Rfl: 0      SOCIAL HISTORY     Social History     Social History Narrative    Not on file     Social History Tobacco Use    Smoking status: Former Smoker    Smokeless tobacco: Never Used   Vaping Use    Vaping Use: Never used   Substance Use Topics    Alcohol use: Not Currently    Drug use: Never         ALLERGIES     Allergies   Allergen Reactions    Pcn [Penicillins]          FAMILY HISTORY   No family history on file. PREVIOUS RECORDS   Previous records reviewed. PHYSICAL EXAM     ED Triage Vitals   BP Temp Temp Source Pulse Resp SpO2 Height Weight   03/24/22 1703 03/24/22 1703 03/24/22 1704 03/24/22 1703 03/24/22 1703 03/24/22 1703 03/24/22 1704 03/24/22 1704   (!) 147/54 97.1 °F (36.2 °C) Axillary 90 17 96 % 5' 2\" (1.575 m) 130 lb (59 kg)     Initial vital signs and nursing assessment reviewed and abnormal from Hypertension. Body mass index is 23.78 kg/m². Pulsoximetry is normal per my interpretation. Additional Vital Signs:  Vitals:    03/24/22 1824   BP: (!) 155/65   Pulse: 80   Resp: 17   Temp:    SpO2: 97%       Physical Exam  Constitutional:       General: She is not in acute distress. Appearance: She is not toxic-appearing. HENT:      Head: Contusion and laceration present. No raccoon eyes, Richards's sign, right periorbital erythema or left periorbital erythema. Jaw: No trismus. Comments: Small hematoma over left temporal area  Eyes:      General: Vision grossly intact. Extraocular Movements: Extraocular movements intact. Conjunctiva/sclera: Conjunctivae normal.      Pupils: Pupils are equal, round, and reactive to light. Cardiovascular:      Rate and Rhythm: Normal rate and regular rhythm. Pulses:           Radial pulses are 2+ on the right side and 2+ on the left side. Femoral pulses are 2+ on the right side and 2+ on the left side. Dorsalis pedis pulses are 2+ on the right side and 2+ on the left side. Heart sounds: Normal heart sounds, S1 normal and S2 normal.   Pulmonary:      Breath sounds: Normal breath sounds and air entry. Chest:      Chest wall: No mass, lacerations, deformity, swelling, crepitus or edema. Abdominal:      Palpations: Abdomen is soft. Tenderness: There is no abdominal tenderness. Musculoskeletal:      Cervical back: No signs of trauma or torticollis. No spinous process tenderness or muscular tenderness. Normal range of motion. Right lower leg: No edema. Left lower leg: No edema. Comments: Pelvis stable, no gross deformities noted to upper or lower extremities   Skin:     General: Skin is warm. Capillary Refill: Capillary refill takes less than 2 seconds. Neurological:      Mental Status: She is alert. GCS: GCS eye subscore is 4. GCS verbal subscore is 5. GCS motor subscore is 6. Psychiatric:         Attention and Perception: She is attentive. Mood and Affect: Affect is angry. Speech: Speech normal.         Cognition and Memory: Cognition is impaired. Memory is impaired. MEDICAL DECISION MAKING   Initial Assessment: This is a 60-year-old female with past medical history of dementia presenting due to fall resulting in a posterior scalp laceration and a small left temporal hematoma. Primary survey was performed without abnormality, secondary survey showing scalp laceration, hematoma. CT imaging of the head and C-spine are negative. Radiographs obtained of the chest and pelvis as well as left humerus were also negative. Cardiac work-up was unremarkable. No gross electrolyte abnormalities. Given an overall unremarkable work-up, because of fall is likely mechanical.  No recent tetanus booster seen in her chart. Cleared from trauma point of view. Laceration posterior scalp was rinsed with sterile saline and repaired using 1 staple. Will return to nursing home. 1. Mechanical fall  2. Scalp laceration. Plan:    Level 2 trauma alert activated   Cardiac work-up, BNP   BMP, CBC, hepatic function panel   Type and screen.    CT, C-spine, head Noncon   Chest x-ray, pelvic x-ray, x-ray of left humerus   Laceration cleansed and repaired   Tetanus booster   Discharge back to nursing home      ED RESULTS   Laboratory results:  Labs Reviewed   CBC WITH AUTO DIFFERENTIAL - Abnormal; Notable for the following components:       Result Value    WBC 11.9 (*)     RBC 3.98 (*)     Hemoglobin 11.6 (*)     MCHC 31.4 (*)     RDW-SD 48.0 (*)     Platelets 731 (*)     Segs Absolute 8.8 (*)     All other components within normal limits   BASIC METABOLIC PANEL W/ REFLEX TO MG FOR LOW K - Abnormal; Notable for the following components:    CO2 22 (*)     Glucose 166 (*)     All other components within normal limits   HEPATIC FUNCTION PANEL - Abnormal; Notable for the following components:    ALT 10 (*)     All other components within normal limits   GLOMERULAR FILTRATION RATE, ESTIMATED - Abnormal; Notable for the following components:    Est, Glom Filt Rate 67 (*)     All other components within normal limits   LIPASE   TROPONIN   BRAIN NATRIURETIC PEPTIDE   ANION GAP   OSMOLALITY   URINALYSIS WITH REFLEX TO CULTURE   TYPE AND SCREEN       Radiologic studies results:  CT Head WO Contrast   Final Result   Impression:   1. No acute intracranial finding. 2. Nonspecific moderate white matter hypoattenuation, most frequently    ascribed to chronic small vessel ischemic disease. 3. Left lateral frontal scalp contusion. This document has been electronically signed by: Rosanna Stuart MD on 03/24/2022    06:35 PM      All CTs at this facility use dose modulation techniques and iterative    reconstructions, and/or weight-based dosing   when appropriate to reduce radiation to a low as reasonably achievable. CT CERVICAL SPINE WO CONTRAST   Final Result   Impression:   1. No acute fracture or traumatic malalignment. 2. Unchanged minimal degenerative spondylolisthesis C4-5 and mild to    moderate degenerative spondylosis of cervical spine.       This document has been electronically signed by: Sujatha Maya MD on 03/24/2022    06:35 PM      All CTs at this facility use dose modulation techniques and iterative    reconstructions, and/or weight-based dosing   when appropriate to reduce radiation to a low as reasonably achievable. XR HUMERUS LEFT (MIN 2 VIEWS)   Final Result   No fracture or acute malalignment. This document has been electronically signed by: Sujatha Maya MD on 03/24/2022    05:38 PM      XR CHEST PORTABLE   Final Result   Impression:   No acute cardiopulmonary finding. This document has been electronically signed by: Sujatha Maya MD on 03/24/2022    05:40 PM      XR PELVIS (1-2 VIEWS)   Final Result   Impression:   No acute finding. This document has been electronically signed by: Sujatha Maya MD on 03/24/2022    05:48 PM          ED Medications administered this visit:   Medications   Tetanus-Diphth-Acell Pertussis (BOOSTRIX) injection 0.5 mL (0.5 mLs IntraMUSCular Given 3/24/22 1853)         ED COURSE     ED Course as of 03/24/22 1907   u Mar 24, 2022   1841 CT CERVICAL SPINE WO CONTRAST  All imaging is negative for traumatic injury. [JT]   1843 Cleared per Trauma team [JT]      ED Course User Index  [JT] Ortega Mortensen MD     Lac Repair    Date/Time: 3/24/2022 7:06 PM  Performed by: Ortega Mortensen MD  Authorized by: Travon Rojas MD     Consent:     Consent obtained:  Emergent situation  Anesthesia (see MAR for exact dosages):      Anesthesia method:  None  Laceration details:     Location:  Scalp    Scalp location:  Occipital    Length (cm):  1    Depth (mm):  4  Repair type:     Repair type:  Simple  Pre-procedure details:     Preparation:  Imaging obtained to evaluate for foreign bodies  Exploration:     Hemostasis achieved with:  Direct pressure    Wound exploration: entire depth of wound probed and visualized      Wound extent: no areolar tissue violation noted, no fascia violation noted, no foreign bodies/material noted, no muscle damage noted, no nerve damage noted, no tendon damage noted, no underlying fracture noted and no vascular damage noted      Contaminated: no    Treatment:     Area cleansed with:  Saline    Amount of cleaning:  Standard    Irrigation solution:  Sterile saline    Irrigation volume:  300cc    Irrigation method:  Pressure wash    Visualized foreign bodies/material removed: no    Skin repair:     Repair method:  Staples    Number of staples:  1  Approximation:     Approximation:  Close  Post-procedure details:     Dressing:  Open (no dressing)    Patient tolerance of procedure: Tolerated well, no immediate complications        Strict return precautions and follow up instructions were discussed with the patient prior to discharge, with which the patient agrees. MEDICATION CHANGES     New Prescriptions    No medications on file         FINAL DISPOSITION     Final diagnoses:   Fall, initial encounter   Laceration of scalp, initial encounter     Condition: condition: fair  Dispo: Discharge to nursing home      This transcription was electronically signed. Parts of this transcriptions may have been dictated by use of voice recognition software and electronically transcribed, and parts may have been transcribed with the assistance of an ED scribe. The transcription may contain errors not detected in proofreading. Please refer to my supervising physician's documentation if my documentation differs.     Electronically Signed: Seth Ku MD, 03/24/22, 7:07 PM          Seth Ku MD  Resident  03/24/22 9797

## 2022-03-24 NOTE — ED NOTES
Paperwork faxed to [facility] for transport back to [facility] at this time.      Sunita Alvarez RN  03/24/22 1937

## 2022-03-25 NOTE — ED PROVIDER NOTES
[id number] [facility]  EMERGENCY MEDICINE ATTENDING ATTESTATION      Evaluation of Maurice Hayes. Case discussed and care plan developed with resident physician. I agree with the resident physician documentation and plan as documented by him, except if my documentation differs. Patient seen, interviewed and examined by me. I reviewed the medical, surgical, family and social history, medications and allergies. I have reviewed the nursing documentation. I have reviewed the patient's vital signs and are normal per my interpretation. Body mass index is 23.78 kg/m². Pulsoxymetry is normal per my interpretation. Brief H&P   Patient c/o fall on eliquis, fell while getting up out of a chair and hit the back of her head on the nightstand, denies complaints at this time    Physical exam is notable for bruising/tenderness to left humerus, left temporal hematoma, 1cm posterior scalp laceration, swelling to LLE. Medical Decision Making   MDM:   Patient is a 72-year-old female who presents as a level 2 trauma activation for fall on Eliquis after what appears to be a mechanical fall. Patient hemodynamically stable and in no distress. Trauma work-up unremarkable and laceration was repaired at bedside after irrigation with 1 staple. Tetanus was updated. Medical work-up unremarkable. Plan:    Discharged to nursing home    Please see the resident physician completed note for final disposition except as documented on this attestation. I have reviewed and interpreted all available lab, radiology and ekg results available at the moment. Diagnosis, treatment and disposition plans were discussed and agreed upon by patient. This transcription was electronically signed. It was dictated by use of voice recognition software and electronically transcribed. The transcription may contain errors not detected in proofreading.      I performed direct supervision and was present for the critical portion following procedures: None  Critical care time on this case: None    Electronically signed by Travon Rojas MD on 3/24/22 at 10:54 PM EDT        Travon Rojas MD  03/24/22 [id number]

## 2022-06-09 NOTE — PLAN OF CARE
----- Message from Barry Bolivar Problem: Falls - Risk of:  Goal: Will remain free from falls  Description: Will remain free from falls  Outcome: Ongoing  Note: Free of falls this shift. Call light within reach. Bed alarm on. Falling star program in place. Problem: Falls - Risk of:  Goal: Absence of physical injury  Description: Absence of physical injury  Outcome: Ongoing  Note: Free of falls this shift. Call light within reach. Bed alarm on. Falling star program in place. Problem: Skin Integrity:  Goal: Will show no infection signs and symptoms  Description: Will show no infection signs and symptoms  Outcome: Ongoing  Note: No new signs or symptoms of infection noted. Problem: Skin Integrity:  Goal: Absence of new skin breakdown  Description: Absence of new skin breakdown  Outcome: Ongoing  Note: See skin assessment. Problem: Confusion - Acute:  Goal: Absence of continued neurological deterioration signs and symptoms  Description: Absence of continued neurological deterioration signs and symptoms  Outcome: Ongoing  Note: Patient has dementia at baseline. Problem: Confusion - Acute:  Goal: Mental status will be restored to baseline  Description: Mental status will be restored to baseline  Outcome: Ongoing  Note: Patient has dementia at baseline. Problem: Discharge Planning:  Goal: Ability to perform activities of daily living will improve  Description: Ability to perform activities of daily living will improve  Outcome: Ongoing  Note: Patient is from home with daughter who is her caregiver. Problem: Injury - Risk of, Physical Injury:  Goal: Will remain free from falls  Description: Will remain free from falls  Outcome: Ongoing  Note: Free of falls this shift. Call light within reach. Bed alarm on. Falling star program in place. Problem: Mood - Altered:  Goal: Mood stable  Description: Mood stable  Outcome: Ongoing  Note: Patient calm.       Problem: Sleep Pattern Disturbance:  Goal: Appears well-rested  Description: Appears well-rested  Outcome: Ongoing  Note: Patient resting in bed comfortably. Problem: DISCHARGE BARRIERS  Goal: Patient's continuum of care needs are met  Outcome: Ongoing  Note: Case management and social on case. Care plan reviewed with patient. Patient  verbalize understanding of the plan of care and contribute to goal setting.

## 2024-09-06 NOTE — ED NOTES
PLEASE ADD PRE AD AND PRE OP ORDERS FOR SURGERY ON 9/30/24. PT WILL SEE DR CLEMENS FOR H&P AND PRE OP Pt being transported to CT at this time.  Pt is stable condition      Delroy Goltz, RN  03/24/22 [id number]